# Patient Record
Sex: FEMALE | Race: WHITE | NOT HISPANIC OR LATINO | Employment: OTHER | URBAN - NONMETROPOLITAN AREA
[De-identification: names, ages, dates, MRNs, and addresses within clinical notes are randomized per-mention and may not be internally consistent; named-entity substitution may affect disease eponyms.]

---

## 2017-01-13 RX ORDER — BUDESONIDE AND FORMOTEROL FUMARATE DIHYDRATE 160; 4.5 UG/1; UG/1
1 AEROSOL RESPIRATORY (INHALATION) 2 TIMES DAILY
Qty: 1 INHALER | Refills: 2 | Status: SHIPPED | OUTPATIENT
Start: 2017-01-13 | End: 2017-01-25 | Stop reason: SDUPTHER

## 2017-01-25 ENCOUNTER — TELEPHONE (OUTPATIENT)
Dept: PULMONOLOGY | Facility: CLINIC | Age: 62
End: 2017-01-25

## 2017-01-25 RX ORDER — ALBUTEROL SULFATE 2.5 MG/3ML
0.83 SOLUTION RESPIRATORY (INHALATION) EVERY 4 HOURS PRN
Qty: 120 VIAL | Refills: 2 | Status: SHIPPED | OUTPATIENT
Start: 2017-01-25 | End: 2017-12-09 | Stop reason: SDUPTHER

## 2017-01-25 RX ORDER — BUDESONIDE AND FORMOTEROL FUMARATE DIHYDRATE 160; 4.5 UG/1; UG/1
1 AEROSOL RESPIRATORY (INHALATION) 2 TIMES DAILY
Qty: 1 INHALER | Refills: 2 | Status: SHIPPED | OUTPATIENT
Start: 2017-01-25

## 2017-01-25 RX ORDER — PREDNISONE 10 MG/1
TABLET ORAL
Qty: 31 TABLET | Refills: 0 | Status: SHIPPED | OUTPATIENT
Start: 2017-01-25

## 2017-12-11 RX ORDER — ALBUTEROL SULFATE 2.5 MG/3ML
SOLUTION RESPIRATORY (INHALATION)
Qty: 30 VIAL | Refills: 2 | Status: SHIPPED | OUTPATIENT
Start: 2017-12-11

## 2022-09-01 ENCOUNTER — TRANSCRIBE ORDERS (OUTPATIENT)
Dept: ADMINISTRATIVE | Facility: HOSPITAL | Age: 67
End: 2022-09-01

## 2022-09-01 DIAGNOSIS — N95.9 MENOPAUSAL PROBLEM: Primary | ICD-10-CM

## 2022-09-12 ENCOUNTER — APPOINTMENT (OUTPATIENT)
Dept: BONE DENSITY | Facility: HOSPITAL | Age: 67
End: 2022-09-12

## 2022-09-13 ENCOUNTER — HOSPITAL ENCOUNTER (OUTPATIENT)
Dept: BONE DENSITY | Facility: HOSPITAL | Age: 67
Discharge: HOME | End: 2022-09-13
Admitting: NURSE PRACTITIONER

## 2022-09-13 DIAGNOSIS — N95.9 MENOPAUSAL PROBLEM: ICD-10-CM

## 2022-09-13 PROCEDURE — 77080 DXA BONE DENSITY AXIAL: CPT | Performed by: RADIOLOGY

## 2022-09-13 PROCEDURE — 77080 DXA BONE DENSITY AXIAL: CPT

## 2022-11-30 ENCOUNTER — TRANSCRIBE ORDERS (OUTPATIENT)
Dept: ONCOLOGY | Facility: HOSPITAL | Age: 67
End: 2022-11-30

## 2022-12-06 PROBLEM — M81.0 OSTEOPOROSIS: Status: ACTIVE | Noted: 2022-12-06

## 2022-12-08 ENCOUNTER — INFUSION (OUTPATIENT)
Dept: ONCOLOGY | Facility: HOSPITAL | Age: 67
End: 2022-12-08

## 2022-12-08 VITALS
SYSTOLIC BLOOD PRESSURE: 102 MMHG | TEMPERATURE: 97.3 F | OXYGEN SATURATION: 93 % | RESPIRATION RATE: 18 BRPM | DIASTOLIC BLOOD PRESSURE: 58 MMHG | HEART RATE: 86 BPM

## 2022-12-08 DIAGNOSIS — M81.0 OSTEOPOROSIS, UNSPECIFIED OSTEOPOROSIS TYPE, UNSPECIFIED PATHOLOGICAL FRACTURE PRESENCE: Primary | ICD-10-CM

## 2022-12-08 PROCEDURE — 96372 THER/PROPH/DIAG INJ SC/IM: CPT

## 2022-12-08 PROCEDURE — 25010000002 DENOSUMAB 60 MG/ML SOLUTION PREFILLED SYRINGE: Performed by: NURSE PRACTITIONER

## 2022-12-08 RX ADMIN — DENOSUMAB 60 MG: 60 INJECTION SUBCUTANEOUS at 11:31

## 2023-04-04 ENCOUNTER — OFFICE VISIT (OUTPATIENT)
Dept: CARDIOLOGY | Facility: CLINIC | Age: 68
End: 2023-04-04
Payer: MEDICARE

## 2023-04-04 VITALS
HEIGHT: 66 IN | DIASTOLIC BLOOD PRESSURE: 63 MMHG | SYSTOLIC BLOOD PRESSURE: 100 MMHG | OXYGEN SATURATION: 90 % | WEIGHT: 119.8 LBS | HEART RATE: 78 BPM | BODY MASS INDEX: 19.25 KG/M2 | RESPIRATION RATE: 18 BRPM

## 2023-04-04 DIAGNOSIS — Z87.891 FORMER SMOKER: ICD-10-CM

## 2023-04-04 DIAGNOSIS — R06.02 SHORTNESS OF BREATH: Primary | ICD-10-CM

## 2023-04-04 DIAGNOSIS — I48.0 PAROXYSMAL ATRIAL FIBRILLATION: ICD-10-CM

## 2023-04-04 DIAGNOSIS — J44.9 CHRONIC OBSTRUCTIVE PULMONARY DISEASE, UNSPECIFIED COPD TYPE: ICD-10-CM

## 2023-04-04 PROBLEM — R07.89 CHEST PAIN, ATYPICAL: Status: ACTIVE | Noted: 2023-04-04

## 2023-04-04 PROBLEM — I10 ESSENTIAL HYPERTENSION: Status: ACTIVE | Noted: 2023-04-04

## 2023-04-04 PROBLEM — I30.0 IDIOPATHIC PERICARDITIS: Status: ACTIVE | Noted: 2023-04-04

## 2023-04-04 PROCEDURE — 93000 ELECTROCARDIOGRAM COMPLETE: CPT | Performed by: INTERNAL MEDICINE

## 2023-04-04 PROCEDURE — 3078F DIAST BP <80 MM HG: CPT | Performed by: INTERNAL MEDICINE

## 2023-04-04 PROCEDURE — 99204 OFFICE O/P NEW MOD 45 MIN: CPT | Performed by: INTERNAL MEDICINE

## 2023-04-04 PROCEDURE — 3074F SYST BP LT 130 MM HG: CPT | Performed by: INTERNAL MEDICINE

## 2023-04-04 RX ORDER — NITROGLYCERIN 80 MG/1
1 PATCH TRANSDERMAL DAILY
COMMUNITY

## 2023-04-04 RX ORDER — AZITHROMYCIN 250 MG/1
250 TABLET, FILM COATED ORAL DAILY
COMMUNITY

## 2023-04-04 RX ORDER — ESCITALOPRAM OXALATE 10 MG/1
10 TABLET ORAL DAILY
COMMUNITY

## 2023-04-04 RX ORDER — BENZONATATE 100 MG/1
100 CAPSULE ORAL 3 TIMES DAILY PRN
COMMUNITY

## 2023-04-04 RX ORDER — AMLODIPINE BESYLATE 5 MG/1
5 TABLET ORAL DAILY
COMMUNITY

## 2023-04-04 RX ORDER — ERGOCALCIFEROL 1.25 MG/1
50000 CAPSULE ORAL WEEKLY
COMMUNITY

## 2023-04-04 RX ORDER — VARENICLINE TARTRATE 1 MG/1
1 TABLET, FILM COATED ORAL 2 TIMES DAILY
COMMUNITY

## 2023-04-04 NOTE — LETTER
April 4, 2023     ANGELITO Combs  [address]  [place] [address]    Patient: Federica Rahman   YOB: 1955   Date of Visit: 4/4/2023       Dear ANGELITO Combs,    Federica Rahman was in my office today. Below are the relevant portions of my assessment and plan of care.           If you have questions, please do not hesitate to call me. I look forward to following Federica along with you.         Sincerely,        Janel Aldana MD        CC: No Recipients

## 2023-04-04 NOTE — PROGRESS NOTES
Subjective   Chief Complaint   Patient presents with   • Hypertension     New pt here to Establish new Cardiologist.    • Atrial Fibrillation     New pt here to Establish new Cardiologist.        History of Present Illness  89 years old white female who is recently moved back to [place] from [facility].  She wants to establish herself for cardiac care.    At this time she denies any chest pain palpitations or shortness of breath.    She has history of hypertension, paroxysmal atrial fibrillation, pericarditis COPD, GERD and tobacco use.    Patient states that she developed acute pericarditis which resulted in atrial fibrillation.  She converted to sinus rhythm spontaneously and was never anticoagulated.  She was evaluated by cardiovascular [facility].  PET scan was negative for ischemia.  He is also not taking any antiarrhythmic medications she is just taking metoprolol tartrate 25 twice daily , Norvasc 5 mg daily and aspirin 81 mg daily.  She does carry nitroglycerin but has not had occasion to use it.    Other medications include albuterol, Breo Ellipta, Lexapro and vitamin D    Past Surgical History:   Procedure Laterality Date   • APPENDECTOMY     •  SECTION     • SHOULDER SURGERY       Family History   Problem Relation Age of Onset   • Heart disease Father    • COPD Father    • Diabetes Father      Past Medical History:   Diagnosis Date   • COPD (chronic obstructive pulmonary disease)    • Osteoporosis        Patient Active Problem List   Diagnosis   • Chronic obstructive pulmonary disease   • Hypoxia   • Sleep apnea   • Shortness of breath   • Pneumonia due to Mycoplasma pneumoniae   • Allergic rhinitis   • Hypersomnia   • Former smoker   • Cough   • Osteoporosis   • Essential hypertension   • Paroxysmal atrial fibrillation   • Idiopathic pericarditis, resolved   • Chest pain, atypical, resolved         Social History     Tobacco Use   • Smoking status: Former     Years: 40.00      Types: Cigarettes     Quit date: 2015     Years since quittin.3   • Smokeless tobacco: Never   Substance Use Topics   • Alcohol use: No   • Drug use: No         The following portions of the patient's history were reviewed and updated as appropriate: allergies, current medications, past family history, past medical history, past social history, past surgical history and problem list.    Allergies   Allergen Reactions   • Other          Current Outpatient Medications:   •  albuterol (PROVENTIL) (2.5 MG/3ML) 0.083% nebulizer solution, USE ONE VIAL IN NEBULIZER EVERY 4 HOURS AS NEEDED FOR  SHORTNESS OF AIR, Disp: 30 vial, Rfl: 2  •  amLODIPine (NORVASC) 5 MG tablet, Take 1 tablet by mouth Daily., Disp: , Rfl:   •  azithromycin (ZITHROMAX) 250 MG tablet, Take 1 tablet by mouth Daily. For 4 days, Disp: , Rfl:   •  benzonatate (TESSALON) 100 MG capsule, Take 1 capsule by mouth 3 (Three) Times a Day As Needed for Cough., Disp: , Rfl:   •  escitalopram (LEXAPRO) 10 MG tablet, Take 1 tablet by mouth Daily., Disp: , Rfl:   •  METOPROLOL TARTRATE PO, Take 25 mg by mouth Daily., Disp: , Rfl:   •  Multiple Vitamins-Iron (QC DAILY MULTIVITAMINS/IRON) tablet, Take 1 tablet by mouth daily., Disp: , Rfl:   •  nitroglycerin (NITRODUR) 0.4 MG/HR patch, Place 1 patch on the skin as directed by provider Daily., Disp: , Rfl:   •  predniSONE (DELTASONE) 10 MG tablet, Take 4 tabs daily x 3 days, then take 3 tabs daily x 3 days, then take 2 tabs daily x 3 days, then take 1 tab daily x 3 days, Disp: 31 tablet, Rfl: 0  •  varenicline (CHANTIX) 1 MG tablet, Take 1 tablet by mouth 2 (Two) Times a Day., Disp: , Rfl:   •  VENTOLIN  (90 BASE) MCG/ACT inhaler, Inhale 2 puffs Every 4 (Four) Hours As Needed for shortness of air., Disp: 1 inhaler, Rfl: 2  •  vitamin D (ERGOCALCIFEROL) 1.25 MG (21558 UT) capsule capsule, Take 1 capsule by mouth 1 (One) Time Per Week., Disp: , Rfl:     Review of Systems   Constitutional: Negative.  "  HENT: Negative.  Negative for congestion.    Eyes: Negative.    Cardiovascular: Negative.  Negative for chest pain, cyanosis, dyspnea on exertion, irregular heartbeat, leg swelling, near-syncope, orthopnea, palpitations, paroxysmal nocturnal dyspnea and syncope.   Respiratory: Negative.  Negative for shortness of breath.    Hematologic/Lymphatic: Negative.    Musculoskeletal: Negative.    Gastrointestinal: Negative.    Neurological: Negative.  Negative for headaches.        Objective      /63 (BP Location: Left arm, Patient Position: Sitting, Cuff Size: Adult)   Pulse 78   Resp 18   Ht 167.6 cm (66\")   Wt 54.3 kg (119 lb 12.8 oz)   SpO2 90%   BMI 19.34 kg/m²     Pulmonary:      Effort: Pulmonary effort is normal.      Breath sounds: Normal breath sounds. No stridor. No wheezing. No rhonchi. No rales.   Cardiovascular:      PMI at left midclavicular line. Normal rate. Regular rhythm. Normal S1. Normal S2.      Murmurs: There is no murmur.      No gallop. No click. No rub.   Pulses:     Intact distal pulses.   Edema:     Peripheral edema absent.         Lab Review:    Lab Results   Component Value Date     04/29/2016    K 4.0 04/29/2016     04/29/2016    BUN 18 04/29/2016    CREATININE 0.62 04/29/2016    GLUCOSE 98 04/29/2016    CALCIUM 8.7 04/29/2016    ALT 33 04/29/2016    ALKPHOS 52 04/29/2016    LABIL2 1.7 04/29/2016     Lab Results   Component Value Date    CKTOTAL 92 04/24/2016     Lab Results   Component Value Date    WBC 10.8 04/29/2016    HGB 13.2 04/29/2016    HCT 40.3 04/29/2016     04/29/2016     No results found for: INR  Lab Results   Component Value Date    MG 2.2 04/29/2016     No results found for: PSA, CHOL, CHLPL, TRIG, HDL, VLDL, LDLHDL  Lab Results   Component Value Date    BNP 19 04/21/2016         ECG 12 Lead    Date/Time: 4/4/2023 6:52 PM  Performed by: Janel Aldana MD  Authorized by: Janel Aldana MD   Comparison: compared with previous ECG " from 1/31/2022  Similar to previous ECG  Rhythm: sinus rhythm  Rate: normal  BPM: 74  Conduction: conduction normal  ST Segments: ST segments normal  T Waves: T waves normal  QRS axis: normal  Other: no other findings  Other findings: right atrial abnormality    Clinical impression: non-specific ECG            I reviewed the patient's new clinical results.  I personally viewed and interpreted the patient's EKG/lab data        Assessment:   Diagnosis Plan   1. Shortness of breath  ECG 12 Lead    ECG 12 Lead      2. Former smoker        3. Chronic obstructive pulmonary disease, unspecified COPD type        4. Paroxysmal atrial fibrillation               Plan:  Patient is 67 years old white female who is here to establish for cardiac care however she is asymptomatic at this time.    Old records were reviewed.  Ischemic work-up was negative for exercise-induced myocardial ischemia with normal LV ejection fraction.    Blood pressure is controlled with Norvasc which she will continue.  She was advised to check her blood pressure closely because it might get too low.    Palpitations are controlled with metoprolol 25 twice daily.    She has 1 episode of transient atrial fibrillation, details are not known why she was not anticoagulated.  [id number] score is 3.  Gender  Age  Hypertension  However she is not on any anticoagulation at this time except for aspirin  We will check event monitor next office visit.    She also has pericarditis which apparently has been resolved.  Happened while she was in [place] few years back.    Follow-up scheduled`  Thank you for giving me the oppertunity to participate in your patient's cardiac care.    Sincerely,    FAITH Aldana M.D. [facility]     No follow-ups on file.

## 2023-04-04 NOTE — LETTER
2023     ANGELITO Combs  [address]  [place] [address]    Patient: Federica Rahman   YOB: 1955   Date of Visit: 2023       Dear ANGELITO Combs    Federica Rahman was in my office today. Below is a copy of my note.    If you have questions, please do not hesitate to call me. I look forward to following Federica along with you.         Sincerely,        Janel Aldana MD        CC: No Recipients    Subjective   Chief Complaint   Patient presents with   • Hypertension     New pt here to Establish new Cardiologist.    • Atrial Fibrillation     New pt here to Establish new Cardiologist.        History of Present Illness      Past Surgical History:   Procedure Laterality Date   • APPENDECTOMY     •  SECTION     • SHOULDER SURGERY       Family History   Problem Relation Age of Onset   • Heart disease Father    • COPD Father    • Diabetes Father      Past Medical History:   Diagnosis Date   • COPD (chronic obstructive pulmonary disease)    • Osteoporosis        Patient Active Problem List   Diagnosis   • Chronic obstructive pulmonary disease   • Hypoxia   • Sleep apnea   • Shortness of breath   • Pneumonia due to Mycoplasma pneumoniae   • Allergic rhinitis   • Hypersomnia   • Former smoker   • Cough   • Osteoporosis         Social History     Tobacco Use   • Smoking status: Former     Years: 40.00     Types: Cigarettes     Quit date: 2015     Years since quittin.3   • Smokeless tobacco: Never   Substance Use Topics   • Alcohol use: No   • Drug use: No         The following portions of the patient's history were reviewed and updated as appropriate: allergies, current medications, past family history, past medical history, past social history, past surgical history and problem list.    Allergies   Allergen Reactions   • Other          Current Outpatient Medications:   •  albuterol (PROVENTIL) (2.5 MG/3ML) 0.083% nebulizer solution, USE ONE VIAL  "IN NEBULIZER EVERY 4 HOURS AS NEEDED FOR  SHORTNESS OF AIR, Disp: 30 vial, Rfl: 2  •  amLODIPine (NORVASC) 5 MG tablet, Take 1 tablet by mouth Daily., Disp: , Rfl:   •  azithromycin (ZITHROMAX) 250 MG tablet, Take 1 tablet by mouth Daily. For 4 days, Disp: , Rfl:   •  benzonatate (TESSALON) 100 MG capsule, Take 1 capsule by mouth 3 (Three) Times a Day As Needed for Cough., Disp: , Rfl:   •  escitalopram (LEXAPRO) 10 MG tablet, Take 1 tablet by mouth Daily., Disp: , Rfl:   •  METOPROLOL TARTRATE PO, Take 25 mg by mouth Daily., Disp: , Rfl:   •  Multiple Vitamins-Iron (QC DAILY MULTIVITAMINS/IRON) tablet, Take 1 tablet by mouth daily., Disp: , Rfl:   •  nitroglycerin (NITRODUR) 0.4 MG/HR patch, Place 1 patch on the skin as directed by provider Daily., Disp: , Rfl:   •  predniSONE (DELTASONE) 10 MG tablet, Take 4 tabs daily x 3 days, then take 3 tabs daily x 3 days, then take 2 tabs daily x 3 days, then take 1 tab daily x 3 days, Disp: 31 tablet, Rfl: 0  •  varenicline (CHANTIX) 1 MG tablet, Take 1 tablet by mouth 2 (Two) Times a Day., Disp: , Rfl:   •  VENTOLIN  (90 BASE) MCG/ACT inhaler, Inhale 2 puffs Every 4 (Four) Hours As Needed for shortness of air., Disp: 1 inhaler, Rfl: 2  •  vitamin D (ERGOCALCIFEROL) 1.25 MG (58057 UT) capsule capsule, Take 1 capsule by mouth 1 (One) Time Per Week., Disp: , Rfl:   •  budesonide-formoterol (SYMBICORT) 160-4.5 MCG/ACT inhaler, Inhale 1 puff 2 (Two) Times a Day. (Patient not taking: Reported on 4/4/2023), Disp: 1 inhaler, Rfl: 2    ROS    Objective      /63 (BP Location: Left arm, Patient Position: Sitting, Cuff Size: Adult)   Pulse 78   Resp 18   Ht 167.6 cm (66\")   Wt 54.3 kg (119 lb 12.8 oz)   SpO2 90%   BMI 19.34 kg/m²     Physical Exam    Lab Review:    Lab Results   Component Value Date     04/29/2016    K 4.0 04/29/2016     04/29/2016    BUN 18 04/29/2016    CREATININE 0.62 04/29/2016    GLUCOSE 98 04/29/2016    CALCIUM 8.7 04/29/2016    " ALT 33 04/29/2016    ALKPHOS 52 04/29/2016    LABIL2 1.7 04/29/2016     Lab Results   Component Value Date    CKTOTAL 92 04/24/2016     Lab Results   Component Value Date    WBC 10.8 04/29/2016    HGB 13.2 04/29/2016    HCT 40.3 04/29/2016     04/29/2016     No results found for: INR  Lab Results   Component Value Date    MG 2.2 04/29/2016     No results found for: PSA, CHOL, CHLPL, TRIG, HDL, VLDL, LDLHDL  Lab Results   Component Value Date    BNP 19 04/21/2016       Procedures    I reviewed the patient's new clinical results.  I personally viewed and interpreted the patient's EKG/lab data       Assessment:  No diagnosis found.       Plan:  No diagnosis found.      Thank you for giving me the oppertunity to participate in your patient's cardiac care.    Sincerely,    FAITH Aldana M.D. FACP [facility]     No follow-ups on file.

## 2023-06-01 ENCOUNTER — OFFICE VISIT (OUTPATIENT)
Dept: CARDIOLOGY | Facility: CLINIC | Age: 68
End: 2023-06-01

## 2023-06-01 VITALS
OXYGEN SATURATION: 97 % | HEIGHT: 66 IN | BODY MASS INDEX: 18.64 KG/M2 | HEART RATE: 66 BPM | WEIGHT: 116 LBS | DIASTOLIC BLOOD PRESSURE: 66 MMHG | SYSTOLIC BLOOD PRESSURE: 116 MMHG

## 2023-06-01 DIAGNOSIS — I10 ESSENTIAL HYPERTENSION: ICD-10-CM

## 2023-06-01 DIAGNOSIS — I48.0 PAROXYSMAL ATRIAL FIBRILLATION: Primary | ICD-10-CM

## 2023-06-01 DIAGNOSIS — J44.9 CHRONIC OBSTRUCTIVE PULMONARY DISEASE, UNSPECIFIED COPD TYPE: ICD-10-CM

## 2023-06-01 NOTE — PROGRESS NOTES
subjective     Chief Complaint   Patient presents with   • Atrial Fibrillation     Follow up   • Shortness of Breath     Follow up   no change     History of Present Illness    Federica is 67 years old white female who is here for cardiology follow-up  She is asymptomatic  Blood pressure is controlled with Norvasc 5 mg daily and metoprolol .  She denies any chest pain palpitations.  She has history of COPD and has been taking albuterol nebulizer treatments and Ventolin HFA.    She has history of transient atrial fibrillation after acute pericarditis while she was in [place]  She converted to sinus rhythm spontaneously and was never anticoagulated.  She was evaluated by cardiovascular [place] [facility].  PET scan was negative for ischemia.  He is also not taking any antiarrhythmic medications she is just taking metoprolol tartrate 25 twice daily , Norvasc 5 mg daily and aspirin 81 mg daily.    Past Surgical History:   Procedure Laterality Date   • APPENDECTOMY     •  SECTION     • SHOULDER SURGERY       Family History   Problem Relation Age of Onset   • Heart disease Father    • COPD Father    • Diabetes Father      Past Medical History:   Diagnosis Date   • COPD (chronic obstructive pulmonary disease)    • Osteoporosis      Patient Active Problem List   Diagnosis   • Chronic obstructive pulmonary disease   • Hypoxia   • Sleep apnea   • Shortness of breath   • Pneumonia due to Mycoplasma pneumoniae   • Allergic rhinitis   • Hypersomnia   • Former smoker   • Cough   • Osteoporosis   • Essential hypertension   • Paroxysmal atrial fibrillation   • Idiopathic pericarditis, resolved   • Chest pain, atypical, resolved       Social History     Tobacco Use   • Smoking status: Former     Years: 40.00     Types: Cigarettes     Quit date: 2015     Years since quittin.5   • Smokeless tobacco: Never   Substance Use Topics   • Alcohol use: No   • Drug use: No       Allergies   Allergen Reactions   • Other         Current Outpatient Medications on File Prior to Visit   Medication Sig   • albuterol (PROVENTIL) (2.5 MG/3ML) 0.083% nebulizer solution USE ONE VIAL IN NEBULIZER EVERY 4 HOURS AS NEEDED FOR  SHORTNESS OF AIR   • amLODIPine (NORVASC) 5 MG tablet Take 1 tablet by mouth Daily.   • escitalopram (LEXAPRO) 10 MG tablet Take 1 tablet by mouth Daily.   • METOPROLOL TARTRATE PO Take 25 mg by mouth Daily.   • VENTOLIN  (90 BASE) MCG/ACT inhaler Inhale 2 puffs Every 4 (Four) Hours As Needed for shortness of air.   • vitamin D (ERGOCALCIFEROL) 1.25 MG (14298 UT) capsule capsule Take 1 capsule by mouth 1 (One) Time Per Week.   • Multiple Vitamins-Iron (QC DAILY MULTIVITAMINS/IRON) tablet Take 1 tablet by mouth daily.   • [DISCONTINUED] azithromycin (ZITHROMAX) 250 MG tablet Take 1 tablet by mouth Daily. For 4 days   • [DISCONTINUED] benzonatate (TESSALON) 100 MG capsule Take 1 capsule by mouth 3 (Three) Times a Day As Needed for Cough.   • [DISCONTINUED] nitroglycerin (NITRODUR) 0.4 MG/HR patch Place 1 patch on the skin as directed by provider Daily. (Patient not taking: Reported on 6/1/2023)   • [DISCONTINUED] predniSONE (DELTASONE) 10 MG tablet Take 4 tabs daily x 3 days, then take 3 tabs daily x 3 days, then take 2 tabs daily x 3 days, then take 1 tab daily x 3 days (Patient not taking: Reported on 6/1/2023)   • [DISCONTINUED] varenicline (CHANTIX) 1 MG tablet Take 1 tablet by mouth 2 (Two) Times a Day. (Patient not taking: Reported on 6/1/2023)     No current facility-administered medications on file prior to visit.         The following portions of the patient's history were reviewed and updated as appropriate: allergies, current medications, past family history, past medical history, past social history, past surgical history and problem list.    Review of Systems   Constitutional: Negative.   HENT: Negative.  Negative for congestion.    Eyes: Negative.    Cardiovascular: Negative.  Negative for chest pain,  "cyanosis, dyspnea on exertion, irregular heartbeat, leg swelling, near-syncope, orthopnea, palpitations, paroxysmal nocturnal dyspnea and syncope.   Respiratory: Negative.  Negative for shortness of breath.    Hematologic/Lymphatic: Negative.    Musculoskeletal: Negative.    Gastrointestinal: Negative.    Neurological: Negative.  Negative for headaches.          Objective:     /66 (BP Location: Left arm, Patient Position: Sitting, Cuff Size: Adult)   Pulse 66   Ht 167.6 cm (66\")   Wt 52.6 kg (116 lb)   SpO2 97%   BMI 18.72 kg/m²   Pulmonary:      Breath sounds: No wheezing. Rhonchi present. No rales.   Cardiovascular:      PMI at left midclavicular line. Normal rate. Regular rhythm. Normal S1. Normal S2.      Murmurs: There is no murmur.      No gallop. No click. No rub.   Pulses:     Intact distal pulses.   Edema:     Peripheral edema absent.           Lab Review  Lab Results   Component Value Date     04/29/2016    K 4.0 04/29/2016     04/29/2016    BUN 18 04/29/2016    CREATININE 0.62 04/29/2016    GLUCOSE 98 04/29/2016    CALCIUM 8.7 04/29/2016    ALT 33 04/29/2016    ALKPHOS 52 04/29/2016    LABIL2 1.7 04/29/2016     Lab Results   Component Value Date    CKTOTAL 92 04/24/2016     Lab Results   Component Value Date    WBC 10.8 04/29/2016    HGB 13.2 04/29/2016    HCT 40.3 04/29/2016     04/29/2016     No results found for: INR  Lab Results   Component Value Date    MG 2.2 04/29/2016     Lab Results   Component Value Date    TSH 0.335 (L) 12/07/2015     Lab Results   Component Value Date    BNP 19 04/21/2016     No results found for: CHLPL, CHOL, TRIG, HDL, VLDL, LDLHDL  No results found for: LDL  No results found for: PROBNP            Procedures       I personally viewed and interpreted the patient's LAB data         Assessment:     1. Paroxysmal atrial fibrillation    2. Essential hypertension    3. Chronic obstructive pulmonary disease, unspecified COPD type          Plan: "     Patient has history of transient atrial fibrillation after acute pericarditis few years ago.  Since then she has not had any palpitations and is not on anticoagulation.  We will arrange 2 weeks Holter monitor to assess arrhythmia status.  Meanwhile she will continue aspirin.  Blood pressure is controlled with lisinopril and Toprol.    She also has COPD and has mild wheezing today she is on Ventolin HFA and Proventil nebulizer treatment.  She will be reevaluated in 6 months.      No follow-ups on file.

## 2023-06-01 NOTE — LETTER
2023     ANGELITO Combs  [address]  [place] [address]    Patient: Federica Rahman   YOB: 1955   Date of Visit: 2023       Dear ANGELITO Combs    Federica Rahman was in my office today. Below is a copy of my note.    If you have questions, please do not hesitate to call me. I look forward to following Federica along with you.         Sincerely,        Janel Aldana MD        CC: No Recipients    subjective     Chief Complaint   Patient presents with   • Atrial Fibrillation     Follow up   • Shortness of Breath     Follow up   no change     History of Present Illness          Past Surgical History:   Procedure Laterality Date   • APPENDECTOMY     •  SECTION     • SHOULDER SURGERY       Family History   Problem Relation Age of Onset   • Heart disease Father    • COPD Father    • Diabetes Father      Past Medical History:   Diagnosis Date   • COPD (chronic obstructive pulmonary disease)    • Osteoporosis      Patient Active Problem List   Diagnosis   • Chronic obstructive pulmonary disease   • Hypoxia   • Sleep apnea   • Shortness of breath   • Pneumonia due to Mycoplasma pneumoniae   • Allergic rhinitis   • Hypersomnia   • Former smoker   • Cough   • Osteoporosis   • Essential hypertension   • Paroxysmal atrial fibrillation   • Idiopathic pericarditis, resolved   • Chest pain, atypical, resolved       Social History     Tobacco Use   • Smoking status: Former     Years: 40.00     Types: Cigarettes     Quit date: 2015     Years since quittin.5   • Smokeless tobacco: Never   Substance Use Topics   • Alcohol use: No   • Drug use: No       Allergies   Allergen Reactions   • Other        Current Outpatient Medications on File Prior to Visit   Medication Sig   • albuterol (PROVENTIL) (2.5 MG/3ML) 0.083% nebulizer solution USE ONE VIAL IN NEBULIZER EVERY 4 HOURS AS NEEDED FOR  SHORTNESS OF AIR   • amLODIPine (NORVASC) 5 MG tablet Take 1 tablet by  "mouth Daily.   • escitalopram (LEXAPRO) 10 MG tablet Take 1 tablet by mouth Daily.   • METOPROLOL TARTRATE PO Take 25 mg by mouth Daily.   • VENTOLIN  (90 BASE) MCG/ACT inhaler Inhale 2 puffs Every 4 (Four) Hours As Needed for shortness of air.   • vitamin D (ERGOCALCIFEROL) 1.25 MG (93756 UT) capsule capsule Take 1 capsule by mouth 1 (One) Time Per Week.   • Multiple Vitamins-Iron (QC DAILY MULTIVITAMINS/IRON) tablet Take 1 tablet by mouth daily.   • nitroglycerin (NITRODUR) 0.4 MG/HR patch Place 1 patch on the skin as directed by provider Daily. (Patient not taking: Reported on 6/1/2023)   • varenicline (CHANTIX) 1 MG tablet Take 1 tablet by mouth 2 (Two) Times a Day. (Patient not taking: Reported on 6/1/2023)   • [DISCONTINUED] azithromycin (ZITHROMAX) 250 MG tablet Take 1 tablet by mouth Daily. For 4 days   • [DISCONTINUED] benzonatate (TESSALON) 100 MG capsule Take 1 capsule by mouth 3 (Three) Times a Day As Needed for Cough.   • [DISCONTINUED] predniSONE (DELTASONE) 10 MG tablet Take 4 tabs daily x 3 days, then take 3 tabs daily x 3 days, then take 2 tabs daily x 3 days, then take 1 tab daily x 3 days (Patient not taking: Reported on 6/1/2023)     No current facility-administered medications on file prior to visit.         The following portions of the patient's history were reviewed and updated as appropriate: allergies, current medications, past family history, past medical history, past social history, past surgical history and problem list.    ROS      Objective:     /66 (BP Location: Left arm, Patient Position: Sitting, Cuff Size: Adult)   Pulse 66   Ht 167.6 cm (66\")   Wt 52.6 kg (116 lb)   SpO2 97%   BMI 18.72 kg/m²   Physical Exam      Lab Review  Lab Results   Component Value Date     04/29/2016    K 4.0 04/29/2016     04/29/2016    BUN 18 04/29/2016    CREATININE 0.62 04/29/2016    GLUCOSE 98 04/29/2016    CALCIUM 8.7 04/29/2016    ALT 33 04/29/2016    ALKPHOS 52 " 04/29/2016    LABIL2 1.7 04/29/2016     Lab Results   Component Value Date    CKTOTAL 92 04/24/2016     Lab Results   Component Value Date    WBC 10.8 04/29/2016    HGB 13.2 04/29/2016    HCT 40.3 04/29/2016     04/29/2016     No results found for: INR  Lab Results   Component Value Date    MG 2.2 04/29/2016     Lab Results   Component Value Date    TSH 0.335 (L) 12/07/2015     Lab Results   Component Value Date    BNP 19 04/21/2016     No results found for: CHLPL, CHOL, TRIG, HDL, VLDL, LDLHDL  No results found for: LDL  No results found for: PROBNP            Procedures       I personally viewed and interpreted the patient's LAB data        Assessment:   No diagnosis found.      Plan:              No follow-ups on file.

## 2023-06-05 ENCOUNTER — INFUSION (OUTPATIENT)
Dept: ONCOLOGY | Facility: HOSPITAL | Age: 68
End: 2023-06-05
Payer: MEDICARE

## 2023-06-05 VITALS
SYSTOLIC BLOOD PRESSURE: 129 MMHG | OXYGEN SATURATION: 95 % | TEMPERATURE: 96.9 F | HEART RATE: 68 BPM | DIASTOLIC BLOOD PRESSURE: 77 MMHG | RESPIRATION RATE: 18 BRPM

## 2023-06-05 DIAGNOSIS — M81.0 OSTEOPOROSIS, UNSPECIFIED OSTEOPOROSIS TYPE, UNSPECIFIED PATHOLOGICAL FRACTURE PRESENCE: Primary | ICD-10-CM

## 2023-06-05 LAB — CALCIUM SPEC-SCNC: 9.1 MG/DL (ref 8.6–10.5)

## 2023-06-05 PROCEDURE — 82310 ASSAY OF CALCIUM: CPT | Performed by: NURSE PRACTITIONER

## 2023-06-05 PROCEDURE — 96372 THER/PROPH/DIAG INJ SC/IM: CPT

## 2023-06-05 PROCEDURE — 25010000002 DENOSUMAB 60 MG/ML SOLUTION PREFILLED SYRINGE: Performed by: NURSE PRACTITIONER

## 2023-06-05 RX ADMIN — DENOSUMAB 60 MG: 60 INJECTION SUBCUTANEOUS at 12:20

## 2023-08-17 ENCOUNTER — LAB (OUTPATIENT)
Dept: LAB | Facility: HOSPITAL | Age: 68
End: 2023-08-17
Payer: MEDICARE

## 2023-08-17 ENCOUNTER — TRANSCRIBE ORDERS (OUTPATIENT)
Dept: ADMINISTRATIVE | Facility: HOSPITAL | Age: 68
End: 2023-08-17
Payer: MEDICARE

## 2023-08-17 DIAGNOSIS — E53.9 VITAMIN B DEFICIENCY: ICD-10-CM

## 2023-08-17 DIAGNOSIS — I51.9 MYXEDEMA HEART DISEASE: ICD-10-CM

## 2023-08-17 DIAGNOSIS — E78.5 HYPERLIPIDEMIA, UNSPECIFIED HYPERLIPIDEMIA TYPE: ICD-10-CM

## 2023-08-17 DIAGNOSIS — E55.9 VITAMIN D DEFICIENCY: ICD-10-CM

## 2023-08-17 DIAGNOSIS — I10 ESSENTIAL (PRIMARY) HYPERTENSION: ICD-10-CM

## 2023-08-17 DIAGNOSIS — R73.9 BLOOD GLUCOSE ELEVATED: ICD-10-CM

## 2023-08-17 DIAGNOSIS — I10 ESSENTIAL (PRIMARY) HYPERTENSION: Primary | ICD-10-CM

## 2023-08-17 DIAGNOSIS — E03.9 MYXEDEMA HEART DISEASE: ICD-10-CM

## 2023-08-17 LAB
25(OH)D3 SERPL-MCNC: 75.3 NG/ML (ref 30–100)
ALBUMIN SERPL-MCNC: 4.5 G/DL (ref 3.5–5.2)
ALBUMIN/GLOB SERPL: 2.4 G/DL
ALP SERPL-CCNC: 46 U/L (ref 39–117)
ALT SERPL W P-5'-P-CCNC: 14 U/L (ref 1–33)
ANION GAP SERPL CALCULATED.3IONS-SCNC: 9 MMOL/L (ref 5–15)
AST SERPL-CCNC: 19 U/L (ref 1–32)
BASOPHILS # BLD AUTO: 0.06 10*3/MM3 (ref 0–0.2)
BASOPHILS NFR BLD AUTO: 1.1 % (ref 0–1.5)
BILIRUB SERPL-MCNC: 0.3 MG/DL (ref 0–1.2)
BUN SERPL-MCNC: 13 MG/DL (ref 8–23)
BUN/CREAT SERPL: 23.2 (ref 7–25)
CALCIUM SPEC-SCNC: 9.3 MG/DL (ref 8.6–10.5)
CHLORIDE SERPL-SCNC: 100 MMOL/L (ref 98–107)
CHOLEST SERPL-MCNC: 204 MG/DL (ref 0–200)
CO2 SERPL-SCNC: 32 MMOL/L (ref 22–29)
CREAT SERPL-MCNC: 0.56 MG/DL (ref 0.57–1)
DEPRECATED RDW RBC AUTO: 44.3 FL (ref 37–54)
EGFRCR SERPLBLD CKD-EPI 2021: 100.2 ML/MIN/1.73
EOSINOPHIL # BLD AUTO: 0.13 10*3/MM3 (ref 0–0.4)
EOSINOPHIL NFR BLD AUTO: 2.3 % (ref 0.3–6.2)
ERYTHROCYTE [DISTWIDTH] IN BLOOD BY AUTOMATED COUNT: 12.4 % (ref 12.3–15.4)
GLOBULIN UR ELPH-MCNC: 1.9 GM/DL
GLUCOSE SERPL-MCNC: 85 MG/DL (ref 65–99)
HBA1C MFR BLD: 5.4 % (ref 4.8–5.6)
HCT VFR BLD AUTO: 42.5 % (ref 34–46.6)
HDLC SERPL-MCNC: 91 MG/DL (ref 40–60)
HGB BLD-MCNC: 13.1 G/DL (ref 12–15.9)
IMM GRANULOCYTES # BLD AUTO: 0.01 10*3/MM3 (ref 0–0.05)
IMM GRANULOCYTES NFR BLD AUTO: 0.2 % (ref 0–0.5)
LDLC SERPL CALC-MCNC: 99 MG/DL (ref 0–100)
LDLC/HDLC SERPL: 1.06 {RATIO}
LYMPHOCYTES # BLD AUTO: 1.33 10*3/MM3 (ref 0.7–3.1)
LYMPHOCYTES NFR BLD AUTO: 23.4 % (ref 19.6–45.3)
MCH RBC QN AUTO: 30 PG (ref 26.6–33)
MCHC RBC AUTO-ENTMCNC: 30.8 G/DL (ref 31.5–35.7)
MCV RBC AUTO: 97.3 FL (ref 79–97)
MONOCYTES # BLD AUTO: 0.62 10*3/MM3 (ref 0.1–0.9)
MONOCYTES NFR BLD AUTO: 10.9 % (ref 5–12)
NEUTROPHILS NFR BLD AUTO: 3.53 10*3/MM3 (ref 1.7–7)
NEUTROPHILS NFR BLD AUTO: 62.1 % (ref 42.7–76)
NRBC BLD AUTO-RTO: 0 /100 WBC (ref 0–0.2)
PLATELET # BLD AUTO: 243 10*3/MM3 (ref 140–450)
PMV BLD AUTO: 9.2 FL (ref 6–12)
POTASSIUM SERPL-SCNC: 4.4 MMOL/L (ref 3.5–5.2)
PROT SERPL-MCNC: 6.4 G/DL (ref 6–8.5)
RBC # BLD AUTO: 4.37 10*6/MM3 (ref 3.77–5.28)
SODIUM SERPL-SCNC: 141 MMOL/L (ref 136–145)
T3FREE SERPL-MCNC: 3.14 PG/ML (ref 2–4.4)
T4 SERPL-MCNC: 6.46 MCG/DL (ref 4.5–11.7)
TRIGL SERPL-MCNC: 81 MG/DL (ref 0–150)
TSH SERPL DL<=0.05 MIU/L-ACNC: 1.5 UIU/ML (ref 0.27–4.2)
VIT B12 BLD-MCNC: 796 PG/ML (ref 211–946)
VLDLC SERPL-MCNC: 14 MG/DL (ref 5–40)
WBC NRBC COR # BLD: 5.68 10*3/MM3 (ref 3.4–10.8)

## 2023-08-17 PROCEDURE — 84481 FREE ASSAY (FT-3): CPT

## 2023-08-17 PROCEDURE — 85025 COMPLETE CBC W/AUTO DIFF WBC: CPT

## 2023-08-17 PROCEDURE — 82306 VITAMIN D 25 HYDROXY: CPT

## 2023-08-17 PROCEDURE — 80053 COMPREHEN METABOLIC PANEL: CPT

## 2023-08-17 PROCEDURE — 80061 LIPID PANEL: CPT

## 2023-08-17 PROCEDURE — 82607 VITAMIN B-12: CPT

## 2023-08-17 PROCEDURE — 83036 HEMOGLOBIN GLYCOSYLATED A1C: CPT

## 2023-08-17 PROCEDURE — 36415 COLL VENOUS BLD VENIPUNCTURE: CPT

## 2023-08-17 PROCEDURE — 84436 ASSAY OF TOTAL THYROXINE: CPT

## 2023-08-17 PROCEDURE — 84443 ASSAY THYROID STIM HORMONE: CPT

## 2023-10-04 ENCOUNTER — HOSPITAL ENCOUNTER (EMERGENCY)
Facility: HOSPITAL | Age: 68
Discharge: HOME | End: 2023-10-04
Attending: STUDENT IN AN ORGANIZED HEALTH CARE EDUCATION/TRAINING PROGRAM | Admitting: STUDENT IN AN ORGANIZED HEALTH CARE EDUCATION/TRAINING PROGRAM
Payer: MEDICARE

## 2023-10-04 ENCOUNTER — APPOINTMENT (OUTPATIENT)
Dept: CT IMAGING | Facility: HOSPITAL | Age: 68
End: 2023-10-04
Payer: MEDICARE

## 2023-10-04 ENCOUNTER — APPOINTMENT (OUTPATIENT)
Dept: GENERAL RADIOLOGY | Facility: HOSPITAL | Age: 68
End: 2023-10-04
Payer: MEDICARE

## 2023-10-04 VITALS
DIASTOLIC BLOOD PRESSURE: 65 MMHG | OXYGEN SATURATION: 98 % | WEIGHT: 117 LBS | SYSTOLIC BLOOD PRESSURE: 104 MMHG | BODY MASS INDEX: 18.8 KG/M2 | HEIGHT: 66 IN | TEMPERATURE: 98.5 F | RESPIRATION RATE: 20 BRPM | HEART RATE: 79 BPM

## 2023-10-04 DIAGNOSIS — J44.1 COPD EXACERBATION: Primary | ICD-10-CM

## 2023-10-04 LAB
A-A DO2: 46.3 MMHG (ref 0–300)
A-A DO2: 64.3 MMHG (ref 0–300)
ALBUMIN SERPL-MCNC: 4.2 G/DL (ref 3.5–5.2)
ALBUMIN/GLOB SERPL: 1.8 G/DL
ALP SERPL-CCNC: 39 U/L (ref 39–117)
ALT SERPL W P-5'-P-CCNC: 12 U/L (ref 1–33)
ANION GAP SERPL CALCULATED.3IONS-SCNC: 6.5 MMOL/L (ref 5–15)
ARTERIAL PATENCY WRIST A: POSITIVE
ARTERIAL PATENCY WRIST A: POSITIVE
AST SERPL-CCNC: 17 U/L (ref 1–32)
ATMOSPHERIC PRESS: 731 MMHG
ATMOSPHERIC PRESS: 732 MMHG
BASE EXCESS BLDA CALC-SCNC: 11.4 MMOL/L (ref 0–2)
BASE EXCESS BLDA CALC-SCNC: 9.9 MMOL/L (ref 0–2)
BASOPHILS # BLD AUTO: 0.06 10*3/MM3 (ref 0–0.2)
BASOPHILS NFR BLD AUTO: 0.7 % (ref 0–1.5)
BDY SITE: ABNORMAL
BDY SITE: ABNORMAL
BILIRUB SERPL-MCNC: 0.2 MG/DL (ref 0–1.2)
BUN SERPL-MCNC: 14 MG/DL (ref 8–23)
BUN/CREAT SERPL: 32.6 (ref 7–25)
CALCIUM SPEC-SCNC: 9 MG/DL (ref 8.6–10.5)
CHLORIDE SERPL-SCNC: 95 MMOL/L (ref 98–107)
CO2 BLDA-SCNC: 39.8 MMOL/L (ref 22–33)
CO2 BLDA-SCNC: 41.5 MMOL/L (ref 22–33)
CO2 SERPL-SCNC: 34.5 MMOL/L (ref 22–29)
COHGB MFR BLD: 1.3 % (ref 0–5)
COHGB MFR BLD: 1.5 % (ref 0–5)
CREAT SERPL-MCNC: 0.43 MG/DL (ref 0.57–1)
CRP SERPL-MCNC: <0.3 MG/DL (ref 0–0.5)
D-LACTATE SERPL-SCNC: 1.3 MMOL/L (ref 0.5–2)
DEPRECATED RDW RBC AUTO: 45.2 FL (ref 37–54)
EGFRCR SERPLBLD CKD-EPI 2021: 106.1 ML/MIN/1.73
EOSINOPHIL # BLD AUTO: 0.1 10*3/MM3 (ref 0–0.4)
EOSINOPHIL NFR BLD AUTO: 1.2 % (ref 0.3–6.2)
ERYTHROCYTE [DISTWIDTH] IN BLOOD BY AUTOMATED COUNT: 12.4 % (ref 12.3–15.4)
ERYTHROCYTE [SEDIMENTATION RATE] IN BLOOD: 10 MM/HR (ref 0–30)
FLUAV RNA RESP QL NAA+PROBE: NOT DETECTED
FLUBV RNA RESP QL NAA+PROBE: NOT DETECTED
GAS FLOW AIRWAY: 2 LPM
GAS FLOW AIRWAY: 2 LPM
GEN 5 2HR TROPONIN T REFLEX: 9 NG/L
GLOBULIN UR ELPH-MCNC: 2.4 GM/DL
GLUCOSE SERPL-MCNC: 102 MG/DL (ref 65–99)
HCO3 BLDA-SCNC: 37.7 MMOL/L (ref 20–26)
HCO3 BLDA-SCNC: 39.4 MMOL/L (ref 20–26)
HCT VFR BLD AUTO: 42.4 % (ref 34–46.6)
HCT VFR BLD CALC: 38.3 % (ref 38–51)
HCT VFR BLD CALC: 38.8 % (ref 38–51)
HGB BLD-MCNC: 12.8 G/DL (ref 12–15.9)
HGB BLDA-MCNC: 12.5 G/DL (ref 13.5–17.5)
HGB BLDA-MCNC: 12.7 G/DL (ref 13.5–17.5)
HOLD SPECIMEN: NORMAL
IMM GRANULOCYTES # BLD AUTO: 0.02 10*3/MM3 (ref 0–0.05)
IMM GRANULOCYTES NFR BLD AUTO: 0.2 % (ref 0–0.5)
INHALED O2 CONCENTRATION: 28 %
INHALED O2 CONCENTRATION: 28 %
LYMPHOCYTES # BLD AUTO: 1.05 10*3/MM3 (ref 0.7–3.1)
LYMPHOCYTES NFR BLD AUTO: 12.9 % (ref 19.6–45.3)
Lab: ABNORMAL
MCH RBC QN AUTO: 29.9 PG (ref 26.6–33)
MCHC RBC AUTO-ENTMCNC: 30.2 G/DL (ref 31.5–35.7)
MCV RBC AUTO: 99.1 FL (ref 79–97)
METHGB BLD QL: 0.2 % (ref 0–3)
METHGB BLD QL: 0.2 % (ref 0–3)
MODALITY: ABNORMAL
MODALITY: ABNORMAL
MONOCYTES # BLD AUTO: 0.72 10*3/MM3 (ref 0.1–0.9)
MONOCYTES NFR BLD AUTO: 8.8 % (ref 5–12)
NEUTROPHILS NFR BLD AUTO: 6.19 10*3/MM3 (ref 1.7–7)
NEUTROPHILS NFR BLD AUTO: 76.2 % (ref 42.7–76)
NOTIFIED BY: ABNORMAL
NOTIFIED WHO: ABNORMAL
NRBC BLD AUTO-RTO: 0 /100 WBC (ref 0–0.2)
NT-PROBNP SERPL-MCNC: 253.4 PG/ML (ref 0–900)
OXYHGB MFR BLDV: 88.7 % (ref 94–99)
OXYHGB MFR BLDV: 94.7 % (ref 94–99)
PCO2 BLDA: 66.1 MM HG (ref 35–45)
PCO2 BLDA: 68.2 MM HG (ref 35–45)
PCO2 TEMP ADJ BLD: ABNORMAL MM[HG]
PCO2 TEMP ADJ BLD: ABNORMAL MM[HG]
PH BLDA: 7.37 PH UNITS (ref 7.35–7.45)
PH BLDA: 7.37 PH UNITS (ref 7.35–7.45)
PH, TEMP CORRECTED: ABNORMAL
PH, TEMP CORRECTED: ABNORMAL
PLATELET # BLD AUTO: 263 10*3/MM3 (ref 140–450)
PMV BLD AUTO: 9.4 FL (ref 6–12)
PO2 BLDA: 51.2 MM HG (ref 83–108)
PO2 BLDA: 71.4 MM HG (ref 83–108)
PO2 TEMP ADJ BLD: ABNORMAL MM[HG]
PO2 TEMP ADJ BLD: ABNORMAL MM[HG]
POTASSIUM SERPL-SCNC: 4.7 MMOL/L (ref 3.5–5.2)
PROT SERPL-MCNC: 6.6 G/DL (ref 6–8.5)
QT INTERVAL: 394 MS
QTC INTERVAL: 403 MS
RBC # BLD AUTO: 4.28 10*6/MM3 (ref 3.77–5.28)
SAO2 % BLDCOA: 90.2 % (ref 94–99)
SAO2 % BLDCOA: 96.1 % (ref 94–99)
SARS-COV-2 RNA RESP QL NAA+PROBE: NOT DETECTED
SODIUM SERPL-SCNC: 136 MMOL/L (ref 136–145)
TROPONIN T DELTA: -2 NG/L
TROPONIN T SERPL HS-MCNC: 11 NG/L
VENTILATOR MODE: ABNORMAL
VENTILATOR MODE: ABNORMAL
WBC NRBC COR # BLD: 8.14 10*3/MM3 (ref 3.4–10.8)
WHOLE BLOOD HOLD COAG: NORMAL
WHOLE BLOOD HOLD SPECIMEN: NORMAL

## 2023-10-04 PROCEDURE — 85025 COMPLETE CBC W/AUTO DIFF WBC: CPT | Performed by: PHYSICIAN ASSISTANT

## 2023-10-04 PROCEDURE — 82375 ASSAY CARBOXYHB QUANT: CPT

## 2023-10-04 PROCEDURE — 83880 ASSAY OF NATRIURETIC PEPTIDE: CPT | Performed by: PHYSICIAN ASSISTANT

## 2023-10-04 PROCEDURE — 71046 X-RAY EXAM CHEST 2 VIEWS: CPT | Performed by: RADIOLOGY

## 2023-10-04 PROCEDURE — 86140 C-REACTIVE PROTEIN: CPT | Performed by: PHYSICIAN ASSISTANT

## 2023-10-04 PROCEDURE — 93005 ELECTROCARDIOGRAM TRACING: CPT | Performed by: STUDENT IN AN ORGANIZED HEALTH CARE EDUCATION/TRAINING PROGRAM

## 2023-10-04 PROCEDURE — 80053 COMPREHEN METABOLIC PANEL: CPT | Performed by: PHYSICIAN ASSISTANT

## 2023-10-04 PROCEDURE — 96374 THER/PROPH/DIAG INJ IV PUSH: CPT

## 2023-10-04 PROCEDURE — 36600 WITHDRAWAL OF ARTERIAL BLOOD: CPT

## 2023-10-04 PROCEDURE — 71250 CT THORAX DX C-: CPT | Performed by: RADIOLOGY

## 2023-10-04 PROCEDURE — 71250 CT THORAX DX C-: CPT

## 2023-10-04 PROCEDURE — 84484 ASSAY OF TROPONIN QUANT: CPT | Performed by: PHYSICIAN ASSISTANT

## 2023-10-04 PROCEDURE — 87040 BLOOD CULTURE FOR BACTERIA: CPT | Performed by: NURSE PRACTITIONER

## 2023-10-04 PROCEDURE — 94799 UNLISTED PULMONARY SVC/PX: CPT

## 2023-10-04 PROCEDURE — 99284 EMERGENCY DEPT VISIT MOD MDM: CPT

## 2023-10-04 PROCEDURE — 83050 HGB METHEMOGLOBIN QUAN: CPT

## 2023-10-04 PROCEDURE — 87636 SARSCOV2 & INF A&B AMP PRB: CPT | Performed by: PHYSICIAN ASSISTANT

## 2023-10-04 PROCEDURE — 94640 AIRWAY INHALATION TREATMENT: CPT

## 2023-10-04 PROCEDURE — 85652 RBC SED RATE AUTOMATED: CPT | Performed by: PHYSICIAN ASSISTANT

## 2023-10-04 PROCEDURE — 25010000002 METHYLPREDNISOLONE PER 125 MG: Performed by: NURSE PRACTITIONER

## 2023-10-04 PROCEDURE — 36415 COLL VENOUS BLD VENIPUNCTURE: CPT

## 2023-10-04 PROCEDURE — 82805 BLOOD GASES W/O2 SATURATION: CPT

## 2023-10-04 PROCEDURE — 71046 X-RAY EXAM CHEST 2 VIEWS: CPT

## 2023-10-04 PROCEDURE — 83605 ASSAY OF LACTIC ACID: CPT | Performed by: NURSE PRACTITIONER

## 2023-10-04 RX ORDER — METHYLPREDNISOLONE SODIUM SUCCINATE 125 MG/2ML
125 INJECTION, POWDER, LYOPHILIZED, FOR SOLUTION INTRAMUSCULAR; INTRAVENOUS ONCE
Status: COMPLETED | OUTPATIENT
Start: 2023-10-04 | End: 2023-10-04

## 2023-10-04 RX ORDER — METHYLPREDNISOLONE 4 MG/1
TABLET ORAL
Qty: 1 EACH | Refills: 0 | Status: SHIPPED | OUTPATIENT
Start: 2023-10-04

## 2023-10-04 RX ORDER — DOXYCYCLINE 100 MG/1
100 CAPSULE ORAL 2 TIMES DAILY
Qty: 20 CAPSULE | Refills: 0 | Status: SHIPPED | OUTPATIENT
Start: 2023-10-04

## 2023-10-04 RX ORDER — IPRATROPIUM BROMIDE AND ALBUTEROL SULFATE 2.5; .5 MG/3ML; MG/3ML
3 SOLUTION RESPIRATORY (INHALATION) ONCE
Status: COMPLETED | OUTPATIENT
Start: 2023-10-04 | End: 2023-10-04

## 2023-10-04 RX ADMIN — METHYLPREDNISOLONE SODIUM SUCCINATE 125 MG: 125 INJECTION, POWDER, FOR SOLUTION INTRAMUSCULAR; INTRAVENOUS at 10:42

## 2023-10-04 RX ADMIN — IPRATROPIUM BROMIDE AND ALBUTEROL SULFATE 3 ML: 2.5; .5 SOLUTION RESPIRATORY (INHALATION) at 10:43

## 2023-10-04 NOTE — ED PROVIDER NOTES
Subjective   History of Present Illness  Patient is a 68-year-old white female presents emergency room today with complaint of shortness of breath.  Patient reports that she has a history of COPD and today for the last 2 weeks has been worse.  Patient reports she does have breathing treatments but denies taking any steroids.  Patient denies any alleviating or worsening factors.  Patient reports symptoms moderate.  Patient denies chest pain.    Shortness of Breath    Review of Systems   Constitutional: Negative.    HENT: Negative.     Eyes: Negative.    Respiratory:  Positive for shortness of breath.    Cardiovascular: Negative.    Gastrointestinal: Negative.    Endocrine: Negative.    Genitourinary: Negative.    Musculoskeletal: Negative.    Skin: Negative.    Allergic/Immunologic: Negative.    Neurological: Negative.    Hematological: Negative.    Psychiatric/Behavioral: Negative.       Past Medical History:   Diagnosis Date    COPD (chronic obstructive pulmonary disease)     Osteoporosis        Allergies   Allergen Reactions    Other        Past Surgical History:   Procedure Laterality Date    APPENDECTOMY       SECTION      SHOULDER SURGERY         Family History   Problem Relation Age of Onset    Heart disease Father     COPD Father     Diabetes Father        Social History     Socioeconomic History    Marital status:    Tobacco Use    Smoking status: Former     Years: 40.00     Types: Cigarettes     Quit date: 2015     Years since quittin.8    Smokeless tobacco: Never   Substance and Sexual Activity    Alcohol use: No    Drug use: No           Objective   Physical Exam  Vitals and nursing note reviewed.   Constitutional:       Appearance: She is well-developed.   HENT:      Head: Normocephalic.      Right Ear: External ear normal.      Left Ear: External ear normal.   Eyes:      Conjunctiva/sclera: Conjunctivae normal.      Pupils: Pupils are equal, round, and reactive to light.    Cardiovascular:      Rate and Rhythm: Normal rate and regular rhythm.      Heart sounds: Normal heart sounds.   Pulmonary:      Effort: Pulmonary effort is normal.      Breath sounds: Wheezing present.   Abdominal:      General: Bowel sounds are normal.      Palpations: Abdomen is soft.   Musculoskeletal:         General: Normal range of motion.      Cervical back: Normal range of motion and neck supple.   Skin:     General: Skin is warm and dry.      Capillary Refill: Capillary refill takes less than 2 seconds.   Neurological:      Mental Status: She is alert and oriented to person, place, and time.   Psychiatric:         Behavior: Behavior normal.         Thought Content: Thought content normal.       Procedures           ED Course  ED Course as of 10/04/23 1408   Wed Oct 04, 2023   0927 ECG 12 Lead Dyspnea  Normal sinus rhythm anterior septal infarct.  Rate 63  QRS 58 QTc 403  Electronically signed by Kasie Swenson DO, 10/04/23, 9:27 AM EDT.   [LK]   1255 Patient reports feeling much better.  Patient declines admission.  Patient's ABG is much improved. [ILIA]      ED Course User Index  [ILIA] Sage Flowers APRN  [LK] Kasie Swenson DO                                           Medical Decision Making  Problems Addressed:  COPD exacerbation: complicated acute illness or injury    Amount and/or Complexity of Data Reviewed  Labs: ordered.  Radiology: ordered.  ECG/medicine tests: ordered. Decision-making details documented in ED Course.    Risk  Prescription drug management.        Final diagnoses:   COPD exacerbation       ED Disposition  ED Disposition       ED Disposition   Discharge    Condition   Stable    Comment   --               Amrita Lara APRN  [address] [address]  [phone]    Schedule an appointment as soon as possible for a visit   For further evaluation         Medication List        New Prescriptions      doxycycline 100 MG capsule  Commonly known as:  MONODOX  Take 1 capsule by mouth 2 (Two) Times a Day.     methylPREDNISolone 4 MG dose pack  Commonly known as: MEDROL  Take as directed on package instructions.               Where to Get Your Medications        These medications were sent to [facility] [address] [place] [address] - [phone]  - [phone] [address] [address]      Phone: [phone]   doxycycline 100 MG capsule  methylPREDNISolone 4 MG dose pack            Sage Flowers, APRN  10/04/23 1409

## 2023-10-09 LAB
BACTERIA SPEC AEROBE CULT: NORMAL
BACTERIA SPEC AEROBE CULT: NORMAL

## 2023-11-29 ENCOUNTER — OFFICE VISIT (OUTPATIENT)
Dept: CARDIOLOGY | Facility: CLINIC | Age: 68
End: 2023-11-29
Payer: MEDICARE

## 2023-11-29 VITALS
BODY MASS INDEX: 18.32 KG/M2 | HEIGHT: 66 IN | DIASTOLIC BLOOD PRESSURE: 68 MMHG | SYSTOLIC BLOOD PRESSURE: 106 MMHG | HEART RATE: 82 BPM | OXYGEN SATURATION: 91 % | WEIGHT: 114 LBS

## 2023-11-29 DIAGNOSIS — I48.0 PAROXYSMAL ATRIAL FIBRILLATION: Primary | ICD-10-CM

## 2023-11-29 DIAGNOSIS — I47.29 NONSUSTAINED VENTRICULAR TACHYCARDIA: ICD-10-CM

## 2023-11-29 DIAGNOSIS — F17.210 CIGARETTE SMOKER: ICD-10-CM

## 2023-11-29 DIAGNOSIS — J44.9 CHRONIC OBSTRUCTIVE PULMONARY DISEASE, UNSPECIFIED COPD TYPE: ICD-10-CM

## 2023-11-29 DIAGNOSIS — I10 ESSENTIAL HYPERTENSION: ICD-10-CM

## 2023-11-29 PROCEDURE — 99214 OFFICE O/P EST MOD 30 MIN: CPT | Performed by: INTERNAL MEDICINE

## 2023-11-29 PROCEDURE — 3074F SYST BP LT 130 MM HG: CPT | Performed by: INTERNAL MEDICINE

## 2023-11-29 PROCEDURE — 3078F DIAST BP <80 MM HG: CPT | Performed by: INTERNAL MEDICINE

## 2023-11-29 NOTE — LETTER
2023     ANGELITO Combs  [address]  [place] [address]    Patient: Federica Rahman   YOB: 1955   Date of Visit: 2023       Dear ANGELITO Combs    Federica Rahman was in my office today. Below is a copy of my note.    If you have questions, please do not hesitate to call me. I look forward to following Federica along with you.         Sincerely,        Janel Aldana MD        CC: No Recipients    subjective     Chief Complaint   Patient presents with   • Atrial Fibrillation     Follow up       Problems Addressed This Visit  No diagnosis found.    History of Present Illness          Past Surgical History:   Procedure Laterality Date   • APPENDECTOMY     •  SECTION     • SHOULDER SURGERY       Family History   Problem Relation Age of Onset   • Heart disease Father    • COPD Father    • Diabetes Father      Past Medical History:   Diagnosis Date   • COPD (chronic obstructive pulmonary disease)    • Osteoporosis      Patient Active Problem List   Diagnosis   • Chronic obstructive pulmonary disease   • Hypoxia   • Sleep apnea   • Shortness of breath   • Pneumonia due to Mycoplasma pneumoniae   • Allergic rhinitis   • Hypersomnia   • Former smoker   • Cough   • Osteoporosis   • Essential hypertension   • Paroxysmal atrial fibrillation   • Idiopathic pericarditis, resolved   • Chest pain, atypical, resolved       Social History     Tobacco Use   • Smoking status: Every Day     Packs/day: 0.25     Years: 40.00     Additional pack years: 0.00     Total pack years: 10.00     Types: Cigarettes     Last attempt to quit: 2015     Years since quittin.0   • Smokeless tobacco: Never   Substance Use Topics   • Alcohol use: No   • Drug use: No       Allergies   Allergen Reactions   • Other        Current Outpatient Medications on File Prior to Visit   Medication Sig   • albuterol (PROVENTIL) (2.5 MG/3ML) 0.083% nebulizer solution USE ONE VIAL IN  "NEBULIZER EVERY 4 HOURS AS NEEDED FOR  SHORTNESS OF AIR   • amLODIPine (NORVASC) 5 MG tablet Take 1 tablet by mouth Daily.   • doxycycline (MONODOX) 100 MG capsule Take 1 capsule by mouth 2 (Two) Times a Day.   • escitalopram (LEXAPRO) 10 MG tablet Take 1 tablet by mouth Daily.   • methylPREDNISolone (MEDROL) 4 MG dose pack Take as directed on package instructions.   • metoprolol tartrate (LOPRESSOR) 25 MG tablet Take 1 tablet by mouth 2 (Two) Times a Day.   • Multiple Vitamins-Iron (QC DAILY MULTIVITAMINS/IRON) tablet Take 1 tablet by mouth daily.   • VENTOLIN  (90 BASE) MCG/ACT inhaler Inhale 2 puffs Every 4 (Four) Hours As Needed for shortness of air.   • vitamin D (ERGOCALCIFEROL) 1.25 MG (18105 UT) capsule capsule Take 1 capsule by mouth 1 (One) Time Per Week.     No current facility-administered medications on file prior to visit.         The following portions of the patient's history were reviewed and updated as appropriate: allergies, current medications, past family history, past medical history, past social history, past surgical history and problem list.    ROS       Objective:     /68 (BP Location: Left arm, Patient Position: Sitting, Cuff Size: Adult)   Pulse 82   Ht 167.6 cm (66\")   Wt 51.7 kg (114 lb)   SpO2 91% Comment: on 2 L O2  didnt have it on  BMI 18.40 kg/m²   Physical Exam      Lab Review  Lab Results   Component Value Date     10/04/2023    K 4.7 10/04/2023    CL 95 (L) 10/04/2023    BUN 14 10/04/2023    CREATININE 0.43 (L) 10/04/2023    GLUCOSE 102 (H) 10/04/2023    CALCIUM 9.0 10/04/2023    ALT 12 10/04/2023    ALKPHOS 39 10/04/2023    LABIL2 1.7 04/29/2016     Lab Results   Component Value Date    CKTOTAL 92 04/24/2016     Lab Results   Component Value Date    WBC 8.14 10/04/2023    HGB 12.8 10/04/2023    HCT 42.4 10/04/2023     10/04/2023     No results found for: \"INR\"  Lab Results   Component Value Date    MG 2.2 04/29/2016     Lab Results   Component " Value Date    TSH 1.500 08/17/2023     Lab Results   Component Value Date    BNP 19 04/21/2016     Lab Results   Component Value Date    CHOL 204 (H) 08/17/2023    TRIG 81 08/17/2023    HDL 91 (H) 08/17/2023    VLDL 14 08/17/2023    LDL 99 08/17/2023     Lab Results   Component Value Date    LDL 99 08/17/2023     Lab Results   Component Value Date    PROBNP 253.4 10/04/2023               Procedures       I personally viewed and interpreted the patient's LAB data         Assessment:   No diagnosis found.      Plan:              No follow-ups on file.

## 2023-11-29 NOTE — PROGRESS NOTES
subjective     Chief Complaint   Patient presents with    Atrial Fibrillation     Follow up       Problems Addressed This Visit  1. Paroxysmal atrial fibrillation    2. Essential hypertension    3. Nonsustained ventricular tachycardia    4. Chronic obstructive pulmonary disease, unspecified COPD type    5. Cigarette smoker        History of Present Illness    Patient is 68 years old white female who is here for cardiology follow-up.  She is doing very well and is totally asymptomatic.  She has COPD with ongoing tobacco use.  She recently went to the hospital with acute bronchitis COPD with acute exacerbation and was given prednisone and antibiotics.  She was hypoxic and hypercapnia  She stated that she is doing much better however she continues to smoke.    She has history of hypertension.  She is taking Norvasc 5 mg daily.  Blood pressure is around 110 systolic today she was advised to check her blood pressure closely and may need to decrease Norvasc.    History of paroxysmal atrial fibrillation which was felt to be secondary to pericarditis.  She was at that time in [place] where quite extensive workup was negative.  She remained in sinus rhythm and was never started on anticoagulation.  [id number] score is 2 (gender).  She is taking Norvasc however her blood pressure is 106/68 and she probably does not even need Norvasc.      Holter monitor had shown 5 beat run of nonsustained V. tach asymptomatic she has been taking Lopressor 25 twice daily and is doing very well.    Past Surgical History:   Procedure Laterality Date    APPENDECTOMY       SECTION      SHOULDER SURGERY       Family History   Problem Relation Age of Onset    Heart disease Father     COPD Father     Diabetes Father      Past Medical History:   Diagnosis Date    COPD (chronic obstructive pulmonary disease)     Osteoporosis      Patient Active Problem List   Diagnosis    Chronic obstructive pulmonary disease    Hypoxia    Sleep apnea     Shortness of breath    Pneumonia due to Mycoplasma pneumoniae    Allergic rhinitis    Hypersomnia    Cough    Osteoporosis    Essential hypertension    Paroxysmal atrial fibrillation    Idiopathic pericarditis, resolved    Chest pain, atypical, resolved    Cigarette smoker    Nonsustained ventricular tachycardia       Social History     Tobacco Use    Smoking status: Every Day     Packs/day: 0.25     Years: 40.00     Additional pack years: 0.00     Total pack years: 10.00     Types: Cigarettes     Last attempt to quit: 2015     Years since quittin.0    Smokeless tobacco: Never   Substance Use Topics    Alcohol use: No    Drug use: No       Allergies   Allergen Reactions    Other        Current Outpatient Medications on File Prior to Visit   Medication Sig    albuterol (PROVENTIL) (2.5 MG/3ML) 0.083% nebulizer solution USE ONE VIAL IN NEBULIZER EVERY 4 HOURS AS NEEDED FOR  SHORTNESS OF AIR    amLODIPine (NORVASC) 5 MG tablet Take 1 tablet by mouth Daily.    doxycycline (MONODOX) 100 MG capsule Take 1 capsule by mouth 2 (Two) Times a Day.    escitalopram (LEXAPRO) 10 MG tablet Take 1 tablet by mouth Daily.    methylPREDNISolone (MEDROL) 4 MG dose pack Take as directed on package instructions.    metoprolol tartrate (LOPRESSOR) 25 MG tablet Take 1 tablet by mouth 2 (Two) Times a Day.    Multiple Vitamins-Iron (QC DAILY MULTIVITAMINS/IRON) tablet Take 1 tablet by mouth daily.    VENTOLIN  (90 BASE) MCG/ACT inhaler Inhale 2 puffs Every 4 (Four) Hours As Needed for shortness of air.    vitamin D (ERGOCALCIFEROL) 1.25 MG (39238 UT) capsule capsule Take 1 capsule by mouth 1 (One) Time Per Week.     No current facility-administered medications on file prior to visit.         The following portions of the patient's history were reviewed and updated as appropriate: allergies, current medications, past family history, past medical history, past social history, past surgical history and problem list.    Review of  "Systems   Constitutional: Negative.   HENT: Negative.  Negative for congestion.    Eyes: Negative.    Cardiovascular: Negative.  Negative for chest pain, cyanosis, dyspnea on exertion, irregular heartbeat, leg swelling, near-syncope, orthopnea, palpitations, paroxysmal nocturnal dyspnea and syncope.   Respiratory: Negative.  Negative for shortness of breath.    Hematologic/Lymphatic: Negative.    Musculoskeletal: Negative.    Gastrointestinal: Negative.    Neurological: Negative.  Negative for headaches.          Objective:     /68 (BP Location: Left arm, Patient Position: Sitting, Cuff Size: Adult)   Pulse 82   Ht 167.6 cm (66\")   Wt 51.7 kg (114 lb)   SpO2 91% Comment: on 2 L O2  didnt have it on  BMI 18.40 kg/m²   Pulmonary:      Effort: Pulmonary effort is normal.      Breath sounds: Normal breath sounds. No stridor. No wheezing. No rhonchi. No rales.   Cardiovascular:      PMI at left midclavicular line. Normal rate. Regular rhythm. Normal S1. Normal S2.       Murmurs: There is no murmur.      No gallop.  No click. No rub.   Pulses:     Intact distal pulses.   Edema:     Peripheral edema absent.           Lab Review  Lab Results   Component Value Date     10/04/2023    K 4.7 10/04/2023    CL 95 (L) 10/04/2023    BUN 14 10/04/2023    CREATININE 0.43 (L) 10/04/2023    GLUCOSE 102 (H) 10/04/2023    CALCIUM 9.0 10/04/2023    ALT 12 10/04/2023    ALKPHOS 39 10/04/2023    LABIL2 1.7 04/29/2016     Lab Results   Component Value Date    CKTOTAL 92 04/24/2016     Lab Results   Component Value Date    WBC 8.14 10/04/2023    HGB 12.8 10/04/2023    HCT 42.4 10/04/2023     10/04/2023     No results found for: \"INR\"  Lab Results   Component Value Date    MG 2.2 04/29/2016     Lab Results   Component Value Date    TSH 1.500 08/17/2023     Lab Results   Component Value Date    BNP 19 04/21/2016     Lab Results   Component Value Date    CHOL 204 (H) 08/17/2023    TRIG 81 08/17/2023    HDL 91 (H) " 08/17/2023    VLDL 14 08/17/2023    LDL 99 08/17/2023     Lab Results   Component Value Date    LDL 99 08/17/2023     Lab Results   Component Value Date    PROBNP 253.4 10/04/2023                 ECG 12 Lead    Date/Time: 12/2/2023 3:15 PM  Performed by: Janel Aldana MD    Authorized by: Janel Aldana MD  Comparison: compared with previous ECG from 10/4/2023  Comparison to previous ECG: Right atrial enlargement is new nonspecific ST changes are more pronounced.  Rhythm: sinus rhythm  Rate: normal  BPM: 82  Conduction: conduction normal  QRS axis: normal  Other findings: non-specific ST-T wave changes and right atrial abnormality    Clinical impression: abnormal EKG             I personally viewed and interpreted the patient's LAB data         Assessment:     1. Paroxysmal atrial fibrillation     [id number] 2-3   3. Nonsustained ventricular tachycardia    4. Chronic obstructive pulmonary disease, unspecified COPD type    5. Cigarette smoker          Plan:     Patient is 68 years old white female who is here for cardiology follow-up.  She has history of transient atrial fibrillation while she was in [place] she had quite extensive cardiac workup done but she was never started on anticoagulation.  It was felt the patient's atrial fibrillation was secondary to pericarditis however for some reason she never received anticoagulation.  She has had no further palpitations.  Holter monitor did not show any atrial fibrillation    She has 5 beat run of nonsustained V. tach and is taking Lopressor doing very well at this time asymptomatic.    Recently she was in the hospital with COPD with acute exacerbation, acute bronchitis and hypercapnic hypoxic respiratory failure.  Cessation of tobacco use was stressed.  She is doing very well, O2 sat is 91% on O2 2 to 3 L/min  Patient does not think metoprolol is causing any problem she has no bronchospasms.  Will continue current medications.  Cessation of  tobacco use was again stressed.  Follow-up scheduled      No follow-ups on file.

## 2023-12-02 PROBLEM — I47.29 NONSUSTAINED VENTRICULAR TACHYCARDIA: Status: ACTIVE | Noted: 2023-12-02

## 2023-12-02 PROCEDURE — 93000 ELECTROCARDIOGRAM COMPLETE: CPT | Performed by: INTERNAL MEDICINE

## 2024-05-24 ENCOUNTER — HOSPITAL ENCOUNTER (EMERGENCY)
Facility: HOSPITAL | Age: 69
Discharge: HOME | End: 2024-05-24
Attending: STUDENT IN AN ORGANIZED HEALTH CARE EDUCATION/TRAINING PROGRAM
Payer: MEDICARE

## 2024-05-24 ENCOUNTER — APPOINTMENT (OUTPATIENT)
Dept: GENERAL RADIOLOGY | Facility: HOSPITAL | Age: 69
End: 2024-05-24
Payer: MEDICARE

## 2024-05-24 VITALS
WEIGHT: 130 LBS | SYSTOLIC BLOOD PRESSURE: 126 MMHG | OXYGEN SATURATION: 100 % | DIASTOLIC BLOOD PRESSURE: 84 MMHG | RESPIRATION RATE: 18 BRPM | HEART RATE: 79 BPM | BODY MASS INDEX: 20.89 KG/M2 | HEIGHT: 66 IN | TEMPERATURE: 97.5 F

## 2024-05-24 DIAGNOSIS — R07.9 CHEST PAIN, UNSPECIFIED TYPE: Primary | ICD-10-CM

## 2024-05-24 LAB
ALBUMIN SERPL-MCNC: 4.4 G/DL (ref 3.5–5.2)
ALBUMIN/GLOB SERPL: 1.6 G/DL
ALP SERPL-CCNC: 58 U/L (ref 39–117)
ALT SERPL W P-5'-P-CCNC: 23 U/L (ref 1–33)
ANION GAP SERPL CALCULATED.3IONS-SCNC: 8.9 MMOL/L (ref 5–15)
AST SERPL-CCNC: 21 U/L (ref 1–32)
BASOPHILS # BLD AUTO: 0.04 10*3/MM3 (ref 0–0.2)
BASOPHILS NFR BLD AUTO: 0.4 % (ref 0–1.5)
BILIRUB SERPL-MCNC: 0.2 MG/DL (ref 0–1.2)
BUN SERPL-MCNC: 15 MG/DL (ref 8–23)
BUN/CREAT SERPL: 24.2 (ref 7–25)
CALCIUM SPEC-SCNC: 9.6 MG/DL (ref 8.6–10.5)
CHLORIDE SERPL-SCNC: 99 MMOL/L (ref 98–107)
CO2 SERPL-SCNC: 31.1 MMOL/L (ref 22–29)
CREAT SERPL-MCNC: 0.62 MG/DL (ref 0.57–1)
DEPRECATED RDW RBC AUTO: 45.8 FL (ref 37–54)
EGFRCR SERPLBLD CKD-EPI 2021: 97.1 ML/MIN/1.73
EOSINOPHIL # BLD AUTO: 0.16 10*3/MM3 (ref 0–0.4)
EOSINOPHIL NFR BLD AUTO: 1.7 % (ref 0.3–6.2)
ERYTHROCYTE [DISTWIDTH] IN BLOOD BY AUTOMATED COUNT: 12.4 % (ref 12.3–15.4)
GEN 5 2HR TROPONIN T REFLEX: 7 NG/L
GLOBULIN UR ELPH-MCNC: 2.8 GM/DL
GLUCOSE SERPL-MCNC: 92 MG/DL (ref 65–99)
HCT VFR BLD AUTO: 42.8 % (ref 34–46.6)
HGB BLD-MCNC: 13.4 G/DL (ref 12–15.9)
HOLD SPECIMEN: NORMAL
HOLD SPECIMEN: NORMAL
IMM GRANULOCYTES # BLD AUTO: 0.04 10*3/MM3 (ref 0–0.05)
IMM GRANULOCYTES NFR BLD AUTO: 0.4 % (ref 0–0.5)
LYMPHOCYTES # BLD AUTO: 1.19 10*3/MM3 (ref 0.7–3.1)
LYMPHOCYTES NFR BLD AUTO: 12.9 % (ref 19.6–45.3)
MCH RBC QN AUTO: 31.1 PG (ref 26.6–33)
MCHC RBC AUTO-ENTMCNC: 31.3 G/DL (ref 31.5–35.7)
MCV RBC AUTO: 99.3 FL (ref 79–97)
MONOCYTES # BLD AUTO: 0.75 10*3/MM3 (ref 0.1–0.9)
MONOCYTES NFR BLD AUTO: 8.1 % (ref 5–12)
NEUTROPHILS NFR BLD AUTO: 7.06 10*3/MM3 (ref 1.7–7)
NEUTROPHILS NFR BLD AUTO: 76.5 % (ref 42.7–76)
NRBC BLD AUTO-RTO: 0 /100 WBC (ref 0–0.2)
PLATELET # BLD AUTO: 246 10*3/MM3 (ref 140–450)
PMV BLD AUTO: 9.3 FL (ref 6–12)
POTASSIUM SERPL-SCNC: 4.7 MMOL/L (ref 3.5–5.2)
PROT SERPL-MCNC: 7.2 G/DL (ref 6–8.5)
QT INTERVAL: 366 MS
QTC INTERVAL: 419 MS
RBC # BLD AUTO: 4.31 10*6/MM3 (ref 3.77–5.28)
SODIUM SERPL-SCNC: 139 MMOL/L (ref 136–145)
TROPONIN T DELTA: 0 NG/L
TROPONIN T SERPL HS-MCNC: 7 NG/L
WBC NRBC COR # BLD AUTO: 9.24 10*3/MM3 (ref 3.4–10.8)
WHOLE BLOOD HOLD COAG: NORMAL
WHOLE BLOOD HOLD SPECIMEN: NORMAL

## 2024-05-24 PROCEDURE — 71046 X-RAY EXAM CHEST 2 VIEWS: CPT

## 2024-05-24 PROCEDURE — 84484 ASSAY OF TROPONIN QUANT: CPT | Performed by: STUDENT IN AN ORGANIZED HEALTH CARE EDUCATION/TRAINING PROGRAM

## 2024-05-24 PROCEDURE — 93010 ELECTROCARDIOGRAM REPORT: CPT | Performed by: INTERNAL MEDICINE

## 2024-05-24 PROCEDURE — 80053 COMPREHEN METABOLIC PANEL: CPT | Performed by: STUDENT IN AN ORGANIZED HEALTH CARE EDUCATION/TRAINING PROGRAM

## 2024-05-24 PROCEDURE — 93005 ELECTROCARDIOGRAM TRACING: CPT | Performed by: STUDENT IN AN ORGANIZED HEALTH CARE EDUCATION/TRAINING PROGRAM

## 2024-05-24 PROCEDURE — 99284 EMERGENCY DEPT VISIT MOD MDM: CPT

## 2024-05-24 PROCEDURE — 71046 X-RAY EXAM CHEST 2 VIEWS: CPT | Performed by: RADIOLOGY

## 2024-05-24 PROCEDURE — 85025 COMPLETE CBC W/AUTO DIFF WBC: CPT | Performed by: STUDENT IN AN ORGANIZED HEALTH CARE EDUCATION/TRAINING PROGRAM

## 2024-05-24 PROCEDURE — 36415 COLL VENOUS BLD VENIPUNCTURE: CPT

## 2024-05-24 RX ORDER — ROSUVASTATIN CALCIUM 20 MG/1
20 TABLET, COATED ORAL DAILY
Qty: 30 TABLET | Refills: 0 | Status: SHIPPED | OUTPATIENT
Start: 2024-05-24

## 2024-05-24 RX ORDER — ASPIRIN 325 MG
325 TABLET ORAL ONCE
Status: DISCONTINUED | OUTPATIENT
Start: 2024-05-24 | End: 2024-05-24 | Stop reason: HOSPADM

## 2024-05-24 RX ORDER — NITROGLYCERIN 0.4 MG/1
0.4 TABLET SUBLINGUAL
Qty: 25 TABLET | Refills: 0 | Status: SHIPPED | OUTPATIENT
Start: 2024-05-24

## 2024-05-24 RX ORDER — SODIUM CHLORIDE 0.9 % (FLUSH) 0.9 %
10 SYRINGE (ML) INJECTION AS NEEDED
Status: DISCONTINUED | OUTPATIENT
Start: 2024-05-24 | End: 2024-05-24 | Stop reason: HOSPADM

## 2024-05-24 NOTE — ED TRIAGE NOTES
MEDICAL SCREENING:    Reason for Visit: chest pain    Patient initially seen in triage.  The patient was advised further evaluation and diagnostic testing will be needed, some of the treatment and testing will be initiated in the lobby in order to begin the process.  The patient will be returned to the waiting area for the time being and possibly be re-assessed by a subsequent ED provider.  The patient will be brought back to the treatment area in as timely manner as possible.

## 2024-05-24 NOTE — ED PROVIDER NOTES
Subjective   History of Present Illness  68-year-old female with past medical history of oxygen dependent COPD (2L), osteoporosis, paroxysmal atrial fibrillation not chronically anticoagulated, and hypertension presents to the emergency room with chest pain x 2 days.  Patient tells me over the past couple of months she has fallen on several different occasions and thinks that her most recent fall may be contributing to her chest pain.  She denies any chest pressure, tightness, or squeezing and at present time tells me that she is chest pain-free.  She does follow with Dr. Turner, cardiologist and reports most recent stress test and echocardiogram being over a year ago in [place].  She denies any shortness of breath, nausea, vomiting, back pain, diaphoresis, or abdominal pain.  She denies any specific aggravating or alleviating factors.  Denies being around any sick contacts or recent travel.  Denies any other complaints or concerns at this time.    Per Dr. Turner's note it appears that patient had an extensive cardiac workup in [place] where she underwent a cardiac PET scan which did not evidence any cardiac ischemia in .    History provided by:  Patient   used: No        Review of Systems   Constitutional: Negative.  Negative for fever.   HENT: Negative.     Respiratory: Negative.     Cardiovascular:  Positive for chest pain.   Gastrointestinal: Negative.  Negative for abdominal pain.   Endocrine: Negative.    Genitourinary: Negative.  Negative for dysuria.   Skin: Negative.    Neurological: Negative.    Psychiatric/Behavioral: Negative.     All other systems reviewed and are negative.      Past Medical History:   Diagnosis Date    COPD (chronic obstructive pulmonary disease)     Osteoporosis        Allergies   Allergen Reactions    Other        Past Surgical History:   Procedure Laterality Date    APPENDECTOMY       SECTION      SHOULDER SURGERY         Family History    Problem Relation Age of Onset    Heart disease Father     COPD Father     Diabetes Father        Social History     Socioeconomic History    Marital status:    Tobacco Use    Smoking status: Every Day     Current packs/day: 0.00     Average packs/day: 0.3 packs/day for 40.0 years (10.0 ttl pk-yrs)     Types: Cigarettes     Start date: 1975     Last attempt to quit: 2015     Years since quittin.4    Smokeless tobacco: Never   Substance and Sexual Activity    Alcohol use: No    Drug use: No           Objective   Physical Exam  Vitals and nursing note reviewed.   Constitutional:       General: She is not in acute distress.     Appearance: She is well-developed. She is not diaphoretic.      Interventions: Nasal cannula in place.      Comments: 2L   HENT:      Head: Normocephalic and atraumatic.      Right Ear: External ear normal.      Left Ear: External ear normal.      Nose: Nose normal.   Eyes:      Conjunctiva/sclera: Conjunctivae normal.      Pupils: Pupils are equal, round, and reactive to light.   Neck:      Vascular: No JVD.      Trachea: No tracheal deviation.   Cardiovascular:      Rate and Rhythm: Normal rate and regular rhythm.      Heart sounds: Normal heart sounds. No murmur heard.  Pulmonary:      Effort: Pulmonary effort is normal. No respiratory distress.      Breath sounds: Normal breath sounds. No wheezing.   Abdominal:      General: Bowel sounds are normal.      Palpations: Abdomen is soft.      Tenderness: There is no abdominal tenderness.   Musculoskeletal:         General: No deformity. Normal range of motion.      Cervical back: Normal range of motion and neck supple.   Skin:     General: Skin is warm and dry.      Coloration: Skin is not pale.      Findings: No erythema or rash.   Neurological:      Mental Status: She is alert and oriented to person, place, and time.      Cranial Nerves: No cranial nerve deficit.   Psychiatric:         Behavior: Behavior normal.          Thought Content: Thought content normal.         Procedures           ED Course  ED Course as of 05/24/24 1952   Fri May 24, 2024   1119 ECG 12 Lead ED Triage Standing Order; Chest Pain  Sinus rhythm, rate 79, QTc 419, no acute ST or T wave changes [CW]   1625 XR Chest 2 View [TK]   1658 Patient remains chest pain free at this time. I have discussed workup with her and she prefers to do cardiac workup as an outpatient. She follows with Dr. Aldana, cardiologist. Will send order for outpatient stress test and have encouraged patient to return to the ER with new or worsening chest pain, otherwise will follow up with cardiologist. [TK]   1720 Discussed pt with Dr. Jones who is agreeable with plan and recommends adding Crestor 20mg and PRN NTG SL tabs. [TK]      ED Course User Index  [CW] Parker Baird DO  [TK] Dee Quinn, ISAMAR                HEART Score: 4                    Results for orders placed or performed during the hospital encounter of 05/24/24   Comprehensive Metabolic Panel    Specimen: Hand, Right; Blood   Result Value Ref Range    Glucose 92 65 - 99 mg/dL    BUN 15 8 - 23 mg/dL    Creatinine 0.62 0.57 - 1.00 mg/dL    Sodium 139 136 - 145 mmol/L    Potassium 4.7 3.5 - 5.2 mmol/L    Chloride 99 98 - 107 mmol/L    CO2 31.1 (H) 22.0 - 29.0 mmol/L    Calcium 9.6 8.6 - 10.5 mg/dL    Total Protein 7.2 6.0 - 8.5 g/dL    Albumin 4.4 3.5 - 5.2 g/dL    ALT (SGPT) 23 1 - 33 U/L    AST (SGOT) 21 1 - 32 U/L    Alkaline Phosphatase 58 39 - 117 U/L    Total Bilirubin 0.2 0.0 - 1.2 mg/dL    Globulin 2.8 gm/dL    A/G Ratio 1.6 g/dL    BUN/Creatinine Ratio 24.2 7.0 - 25.0    Anion Gap 8.9 5.0 - 15.0 mmol/L    eGFR 97.1 >60.0 mL/min/1.73   High Sensitivity Troponin T    Specimen: Hand, Right; Blood   Result Value Ref Range    HS Troponin T 7 <14 ng/L   CBC Auto Differential    Specimen: Hand, Right; Blood   Result Value Ref Range    WBC 9.24 3.40 - 10.80 10*3/mm3    RBC 4.31 3.77 - 5.28 10*6/mm3     Hemoglobin 13.4 12.0 - 15.9 g/dL    Hematocrit 42.8 34.0 - 46.6 %    MCV 99.3 (H) 79.0 - 97.0 fL    MCH 31.1 26.6 - 33.0 pg    MCHC 31.3 (L) 31.5 - 35.7 g/dL    RDW 12.4 12.3 - 15.4 %    RDW-SD 45.8 37.0 - 54.0 fl    MPV 9.3 6.0 - 12.0 fL    Platelets 246 140 - 450 10*3/mm3    Neutrophil % 76.5 (H) 42.7 - 76.0 %    Lymphocyte % 12.9 (L) 19.6 - 45.3 %    Monocyte % 8.1 5.0 - 12.0 %    Eosinophil % 1.7 0.3 - 6.2 %    Basophil % 0.4 0.0 - 1.5 %    Immature Grans % 0.4 0.0 - 0.5 %    Neutrophils, Absolute 7.06 (H) 1.70 - 7.00 10*3/mm3    Lymphocytes, Absolute 1.19 0.70 - 3.10 10*3/mm3    Monocytes, Absolute 0.75 0.10 - 0.90 10*3/mm3    Eosinophils, Absolute 0.16 0.00 - 0.40 10*3/mm3    Basophils, Absolute 0.04 0.00 - 0.20 10*3/mm3    Immature Grans, Absolute 0.04 0.00 - 0.05 10*3/mm3    nRBC 0.0 0.0 - 0.2 /100 WBC   High Sensitivity Troponin T 2Hr    Specimen: Blood   Result Value Ref Range    HS Troponin T 7 <14 ng/L    Troponin T Delta 0 >=-4 - <+4 ng/L   ECG 12 Lead ED Triage Standing Order; Chest Pain   Result Value Ref Range    QT Interval 366 ms    QTC Interval 419 ms   Green Top (Gel)   Result Value Ref Range    Extra Tube Hold for add-ons.    Lavender Top   Result Value Ref Range    Extra Tube hold for add-on    Gold Top - SST   Result Value Ref Range    Extra Tube Hold for add-ons.    Light Blue Top   Result Value Ref Range    Extra Tube Hold for add-ons.        XR Chest 2 View   Final Result   No acute cardiopulmonary process.           This report was finalized on 5/24/2024 12:05 PM by Stef Ribeiro M.D..                        Medical Decision Making  Problems Addressed:  Chest pain, unspecified type: complicated acute illness or injury    Amount and/or Complexity of Data Reviewed  Labs: ordered.  Radiology: ordered. Decision-making details documented in ED Course.  ECG/medicine tests: ordered. Decision-making details documented in ED Course.    Risk  OTC drugs.  Prescription drug  management.        Final diagnoses:   Chest pain, unspecified type       ED Disposition  ED Disposition       ED Disposition   Discharge    Condition   Stable    Comment   --               Bhupendra Lara, ANGELITO  [address]  [facility] [address]  [place] [address]  [phone]    In 2 days      Janel Aldana MD  [address] [place] [address]  [phone]    In 2 days           Medication List        New Prescriptions      nitroglycerin 0.4 MG SL tablet  Commonly known as: NITROSTAT  Place 1 tablet under the tongue Every 5 (Five) Minutes As Needed for Chest Pain. Take no more than 3 doses in 15 minutes.     rosuvastatin 20 MG tablet  Commonly known as: Crestor  Take 1 tablet by mouth Daily.               Where to Get Your Medications        These medications were sent to [facility] [address] [place] [address] [phone]  - [phone] [place] [address]      Phone: [phone]   nitroglycerin 0.4 MG SL tablet  rosuvastatin 20 MG tablet            Dee Quinn PADeanneC  05/24/24 1952

## 2024-05-28 ENCOUNTER — TRANSCRIBE ORDERS (OUTPATIENT)
Dept: ADMINISTRATIVE | Facility: HOSPITAL | Age: 69
End: 2024-05-28
Payer: MEDICARE

## 2024-05-28 DIAGNOSIS — R07.9 CHEST PAIN, UNSPECIFIED TYPE: Primary | ICD-10-CM

## 2024-06-03 ENCOUNTER — HOSPITAL ENCOUNTER (OUTPATIENT)
Dept: NUCLEAR MEDICINE | Facility: HOSPITAL | Age: 69
Discharge: HOME | End: 2024-06-03
Payer: MEDICARE

## 2024-06-03 ENCOUNTER — HOSPITAL ENCOUNTER (OUTPATIENT)
Dept: CARDIOLOGY | Facility: HOSPITAL | Age: 69
Discharge: HOME | End: 2024-06-03
Payer: MEDICARE

## 2024-06-03 DIAGNOSIS — R07.9 CHEST PAIN, UNSPECIFIED TYPE: ICD-10-CM

## 2024-06-03 LAB
BH CV NUCLEAR PRIOR STUDY: 3
BH CV REST NUCLEAR ISOTOPE DOSE: 9.4 MCI
BH CV STRESS BP STAGE 1: NORMAL
BH CV STRESS COMMENTS STAGE 1: NORMAL
BH CV STRESS DOSE REGADENOSON STAGE 1: 0.4
BH CV STRESS DURATION MIN STAGE 1: 0
BH CV STRESS DURATION SEC STAGE 1: 10
BH CV STRESS HR STAGE 1: 100
BH CV STRESS NUCLEAR ISOTOPE DOSE: 29.5 MCI
BH CV STRESS PROTOCOL 1: NORMAL
BH CV STRESS RECOVERY BP: NORMAL MMHG
BH CV STRESS RECOVERY HR: 96 BPM
BH CV STRESS STAGE 1: 1
LV EF NUC BP: 79 %
MAXIMAL PREDICTED HEART RATE: 152 BPM
PERCENT MAX PREDICTED HR: 65.79 %
STRESS BASELINE BP: NORMAL MMHG
STRESS BASELINE HR: 65 BPM
STRESS PERCENT HR: 77 %
STRESS POST PEAK BP: NORMAL MMHG
STRESS POST PEAK HR: 100 BPM
STRESS TARGET HR: 129 BPM

## 2024-06-03 PROCEDURE — A9500 TC99M SESTAMIBI: HCPCS | Performed by: INTERNAL MEDICINE

## 2024-06-03 PROCEDURE — 78452 HT MUSCLE IMAGE SPECT MULT: CPT

## 2024-06-03 PROCEDURE — 93018 CV STRESS TEST I&R ONLY: CPT | Performed by: INTERNAL MEDICINE

## 2024-06-03 PROCEDURE — 78452 HT MUSCLE IMAGE SPECT MULT: CPT | Performed by: INTERNAL MEDICINE

## 2024-06-03 PROCEDURE — 93017 CV STRESS TEST TRACING ONLY: CPT

## 2024-06-03 PROCEDURE — 0 TECHNETIUM SESTAMIBI: Performed by: INTERNAL MEDICINE

## 2024-06-03 PROCEDURE — 25010000002 REGADENOSON 0.4 MG/5ML SOLUTION: Performed by: INTERNAL MEDICINE

## 2024-06-03 RX ORDER — REGADENOSON 0.08 MG/ML
0.4 INJECTION, SOLUTION INTRAVENOUS
Status: COMPLETED | OUTPATIENT
Start: 2024-06-03 | End: 2024-06-03

## 2024-06-03 RX ADMIN — TECHNETIUM TC 99M SESTAMIBI 1 DOSE: 1 INJECTION INTRAVENOUS at 09:50

## 2024-06-03 RX ADMIN — REGADENOSON 0.4 MG: 0.08 INJECTION, SOLUTION INTRAVENOUS at 09:50

## 2024-06-03 RX ADMIN — TECHNETIUM TC 99M SESTAMIBI 1 DOSE: 1 INJECTION INTRAVENOUS at 08:36

## 2024-06-04 ENCOUNTER — OFFICE VISIT (OUTPATIENT)
Dept: CARDIOLOGY | Facility: CLINIC | Age: 69
End: 2024-06-04
Payer: MEDICARE

## 2024-06-04 VITALS
BODY MASS INDEX: 19.61 KG/M2 | WEIGHT: 122 LBS | HEART RATE: 87 BPM | SYSTOLIC BLOOD PRESSURE: 110 MMHG | HEIGHT: 66 IN | OXYGEN SATURATION: 95 % | DIASTOLIC BLOOD PRESSURE: 62 MMHG

## 2024-06-04 DIAGNOSIS — I48.0 PAROXYSMAL ATRIAL FIBRILLATION: ICD-10-CM

## 2024-06-04 DIAGNOSIS — I10 ESSENTIAL HYPERTENSION: Primary | ICD-10-CM

## 2024-06-04 DIAGNOSIS — E78.2 MIXED HYPERLIPIDEMIA: ICD-10-CM

## 2024-06-04 PROCEDURE — 3074F SYST BP LT 130 MM HG: CPT | Performed by: INTERNAL MEDICINE

## 2024-06-04 PROCEDURE — 3078F DIAST BP <80 MM HG: CPT | Performed by: INTERNAL MEDICINE

## 2024-06-04 PROCEDURE — 99214 OFFICE O/P EST MOD 30 MIN: CPT | Performed by: INTERNAL MEDICINE

## 2024-06-04 RX ORDER — FLUTICASONE FUROATE AND VILANTEROL TRIFENATATE 100; 25 UG/1; UG/1
1 POWDER RESPIRATORY (INHALATION) DAILY
COMMUNITY
Start: 2024-05-29

## 2024-06-04 RX ORDER — PREDNISONE 5 MG/1
1 TABLET ORAL SEE ADMIN INSTRUCTIONS
COMMUNITY
Start: 2024-05-07

## 2024-06-04 NOTE — PROGRESS NOTES
subjective     Chief Complaint   Patient presents with    Atrial Fibrillation    Hospital Follow Up Visit     No symptoms since       Problems Addressed This Visit  1. Essential hypertension    2. Paroxysmal atrial fibrillation    3. Mixed hyperlipidemia        History of Present Illness  Federica is 68 years old white female who recently went to the emergency room because of chest pain.  She underwent a stress test which was done yesterday and was negative for significant exercise-induced myocardial ischemia.  She has mild hyperlipidemia and was started on Crestor.  No drug side effects.    Blood pressure is very well-controlled.  She is taking Norvasc 5 mg daily along with Lopressor 25 twice daily.    Paroxysmal atrial fibrillation she is maintaining sinus rhythm and is taking Lopressor 25 twice daily.    Patient has COPD and is taking Breo Ellipta, prednisone and Proventil HFA along with Proventil nebulizer treatment        Past Surgical History:   Procedure Laterality Date    APPENDECTOMY       SECTION      SHOULDER SURGERY       Family History   Problem Relation Age of Onset    Heart disease Father     COPD Father     Diabetes Father      Past Medical History:   Diagnosis Date    COPD (chronic obstructive pulmonary disease)     Osteoporosis      Patient Active Problem List   Diagnosis    Chronic obstructive pulmonary disease    Hypoxia    Sleep apnea    Shortness of breath    Pneumonia due to Mycoplasma pneumoniae    Allergic rhinitis    Hypersomnia    Cough    Osteoporosis    Essential hypertension    Paroxysmal atrial fibrillation    Idiopathic pericarditis, resolved    Chest pain, atypical, resolved    Cigarette smoker    Nonsustained ventricular tachycardia    Mixed hyperlipidemia       Social History     Tobacco Use    Smoking status: Every Day     Current packs/day: 0.00     Average packs/day: 0.3 packs/day for 40.0 years (10.0 ttl pk-yrs)     Types: Cigarettes     Start date: 1975     Last  attempt to quit: 2015     Years since quittin.5    Smokeless tobacco: Never   Substance Use Topics    Alcohol use: No    Drug use: No       Allergies   Allergen Reactions    Other        Current Outpatient Medications on File Prior to Visit   Medication Sig    albuterol (PROVENTIL) (2.5 MG/3ML) 0.083% nebulizer solution USE ONE VIAL IN NEBULIZER EVERY 4 HOURS AS NEEDED FOR  SHORTNESS OF AIR    amLODIPine (NORVASC) 5 MG tablet Take 1 tablet by mouth Daily.    Breo Ellipta 100-25 MCG/ACT aerosol powder  Inhale 1 puff Daily.    doxycycline (MONODOX) 100 MG capsule Take 1 capsule by mouth 2 (Two) Times a Day.    escitalopram (LEXAPRO) 10 MG tablet Take 1 tablet by mouth Daily.    metoprolol tartrate (LOPRESSOR) 25 MG tablet Take 1 tablet by mouth 2 (Two) Times a Day.    Multiple Vitamins-Iron (QC DAILY MULTIVITAMINS/IRON) tablet Take 1 tablet by mouth daily.    nitroglycerin (NITROSTAT) 0.4 MG SL tablet Place 1 tablet under the tongue Every 5 (Five) Minutes As Needed for Chest Pain. Take no more than 3 doses in 15 minutes.    predniSONE 5 MG (21) tablet therapy pack dose pack 1 tablet See Admin Instructions.    rosuvastatin (Crestor) 20 MG tablet Take 1 tablet by mouth Daily.    VENTOLIN  (90 BASE) MCG/ACT inhaler Inhale 2 puffs Every 4 (Four) Hours As Needed for shortness of air.    vitamin D (ERGOCALCIFEROL) 1.25 MG (65388 UT) capsule capsule Take 1 capsule by mouth 1 (One) Time Per Week.    [DISCONTINUED] methylPREDNISolone (MEDROL) 4 MG dose pack Take as directed on package instructions.     No current facility-administered medications on file prior to visit.         The following portions of the patient's history were reviewed and updated as appropriate: allergies, current medications, past family history, past medical history, past social history, past surgical history and problem list.    Review of Systems   Constitutional: Negative.   HENT: Negative.  Negative for congestion.    Eyes: Negative.   "  Cardiovascular: Negative.  Negative for chest pain, cyanosis, dyspnea on exertion, irregular heartbeat, leg swelling, near-syncope, orthopnea, palpitations, paroxysmal nocturnal dyspnea and syncope.   Respiratory: Negative.  Negative for shortness of breath.    Hematologic/Lymphatic: Negative.    Musculoskeletal: Negative.    Gastrointestinal: Negative.    Neurological: Negative.  Negative for headaches.          Objective:     /62 (BP Location: Left arm, Patient Position: Sitting, Cuff Size: Adult)   Pulse 87   Ht 167.6 cm (66\")   Wt 55.3 kg (122 lb)   SpO2 95%   BMI 19.69 kg/m²   Pulmonary:      Breath sounds: Rhonchi present.   Cardiovascular:      PMI at left midclavicular line. Normal rate. Regular rhythm. Normal S1. Normal S2.       Murmurs: There is no murmur.      No gallop.  No click. No rub.   Pulses:     Intact distal pulses.   Edema:     Peripheral edema absent.           Lab Review  Lab Results   Component Value Date     05/24/2024    K 4.7 05/24/2024    CL 99 05/24/2024    BUN 15 05/24/2024    CREATININE 0.62 05/24/2024    GLUCOSE 92 05/24/2024    CALCIUM 9.6 05/24/2024    ALT 23 05/24/2024    ALKPHOS 58 05/24/2024    LABIL2 1.7 04/29/2016     Lab Results   Component Value Date    CKTOTAL 92 04/24/2016     Lab Results   Component Value Date    WBC 9.24 05/24/2024    HGB 13.4 05/24/2024    HCT 42.8 05/24/2024     05/24/2024     No results found for: \"INR\"  Lab Results   Component Value Date    MG 2.2 04/29/2016     Lab Results   Component Value Date    TSH 1.500 08/17/2023     Lab Results   Component Value Date    BNP 19 04/21/2016     Lab Results   Component Value Date    CHOL 204 (H) 08/17/2023    TRIG 81 08/17/2023    HDL 91 (H) 08/17/2023    VLDL 14 08/17/2023    LDL 99 08/17/2023     Lab Results   Component Value Date    LDL 99 08/17/2023     Lab Results   Component Value Date    PROBNP 253.4 10/04/2023               Procedures       I personally viewed and interpreted the " patient's LAB data         Assessment:     1. Essential hypertension    2. Paroxysmal atrial fibrillation    3. Mixed hyperlipidemia          Plan:     Hypertension  Blood pressure is very well-controlled.  No change in therapy was made.      History of paroxysmal atrial fibrillation which was felt to be secondary to pericarditis.  She was at that time in [place] where quite extensive workup was negative.  She remained in sinus rhythm and was never started on anticoagulation.  [id number] score is 2 (gender).  She was not started on anticoagulation at that time and has not had any further episode of atrial fibrillation.    Holter monitor had shown 5 beat run of nonsustained V. tach asymptomatic she has been taking Lopressor 25 twice daily and is doing very well.    Stress test is negative for significant exercise-induced myocardial ischemia.    She is not taking Crestor without any side effects.    Cessation of tobacco use stressed.  Follow-up scheduled      No follow-ups on file.

## 2024-06-04 NOTE — LETTER
June 4, 2024     ANGELITO Reyes  [address]  [place] [address]    Patient: Federica Rahman   YOB: 1955   Date of Visit: 6/4/2024       Dear ANGELITO Reyes    Federica Rahman was in my office today. Below is a copy of my note.    If you have questions, please do not hesitate to call me. I look forward to following Federica along with you.         Sincerely,        Janel Aldana MD        CC: No Recipients    No notes on file

## 2024-06-05 ENCOUNTER — TELEPHONE (OUTPATIENT)
Dept: CARDIOLOGY | Facility: CLINIC | Age: 69
End: 2024-06-05
Payer: MEDICARE

## 2024-06-05 NOTE — TELEPHONE ENCOUNTER
Spoke to patient regarding normal stress results per Dr Carson's request. Patient voiced understanding and will follow up with Dr Aldana.

## 2024-06-05 NOTE — TELEPHONE ENCOUNTER
----- Message from Judy RENTERIA sent at 6/4/2024  9:53 AM EDT -----  L/M FOR THE PATIENT TO CALL THE OFFICE FOR TEST RESULTS.

## 2024-08-28 ENCOUNTER — APPOINTMENT (OUTPATIENT)
Dept: GENERAL RADIOLOGY | Facility: HOSPITAL | Age: 69
End: 2024-08-28
Payer: MEDICARE

## 2024-08-28 ENCOUNTER — HOSPITAL ENCOUNTER (EMERGENCY)
Facility: HOSPITAL | Age: 69
Discharge: HOME | End: 2024-08-28
Attending: EMERGENCY MEDICINE
Payer: MEDICARE

## 2024-08-28 VITALS
RESPIRATION RATE: 24 BRPM | SYSTOLIC BLOOD PRESSURE: 124 MMHG | BODY MASS INDEX: 19.29 KG/M2 | WEIGHT: 120 LBS | HEIGHT: 66 IN | TEMPERATURE: 97.7 F | DIASTOLIC BLOOD PRESSURE: 60 MMHG | OXYGEN SATURATION: 97 % | HEART RATE: 86 BPM

## 2024-08-28 DIAGNOSIS — J44.1 COPD WITH ACUTE EXACERBATION: Primary | ICD-10-CM

## 2024-08-28 LAB
ALBUMIN SERPL-MCNC: 3.9 G/DL (ref 3.5–5.2)
ALBUMIN/GLOB SERPL: 1.6 G/DL
ALP SERPL-CCNC: 47 U/L (ref 39–117)
ALT SERPL W P-5'-P-CCNC: 9 U/L (ref 1–33)
ANION GAP SERPL CALCULATED.3IONS-SCNC: 5.9 MMOL/L (ref 5–15)
APTT PPP: 29.5 SECONDS (ref 26.5–34.5)
AST SERPL-CCNC: 13 U/L (ref 1–32)
BASOPHILS # BLD AUTO: 0.06 10*3/MM3 (ref 0–0.2)
BASOPHILS NFR BLD AUTO: 0.9 % (ref 0–1.5)
BILIRUB SERPL-MCNC: 0.3 MG/DL (ref 0–1.2)
BUN SERPL-MCNC: 16 MG/DL (ref 8–23)
BUN/CREAT SERPL: 37.2 (ref 7–25)
CALCIUM SPEC-SCNC: 9.4 MG/DL (ref 8.6–10.5)
CHLORIDE SERPL-SCNC: 98 MMOL/L (ref 98–107)
CO2 SERPL-SCNC: 34.1 MMOL/L (ref 22–29)
CREAT SERPL-MCNC: 0.43 MG/DL (ref 0.57–1)
CRP SERPL-MCNC: <0.3 MG/DL (ref 0–0.5)
D-LACTATE SERPL-SCNC: 1.3 MMOL/L (ref 0.5–2)
DEPRECATED RDW RBC AUTO: 45.5 FL (ref 37–54)
EGFRCR SERPLBLD CKD-EPI 2021: 106.1 ML/MIN/1.73
EOSINOPHIL # BLD AUTO: 0.17 10*3/MM3 (ref 0–0.4)
EOSINOPHIL NFR BLD AUTO: 2.6 % (ref 0.3–6.2)
ERYTHROCYTE [DISTWIDTH] IN BLOOD BY AUTOMATED COUNT: 12.4 % (ref 12.3–15.4)
FLUAV RNA RESP QL NAA+PROBE: NOT DETECTED
FLUBV RNA RESP QL NAA+PROBE: NOT DETECTED
GLOBULIN UR ELPH-MCNC: 2.5 GM/DL
GLUCOSE SERPL-MCNC: 108 MG/DL (ref 65–99)
HCT VFR BLD AUTO: 40.2 % (ref 34–46.6)
HGB BLD-MCNC: 12.2 G/DL (ref 12–15.9)
HOLD SPECIMEN: NORMAL
HOLD SPECIMEN: NORMAL
IMM GRANULOCYTES # BLD AUTO: 0.01 10*3/MM3 (ref 0–0.05)
IMM GRANULOCYTES NFR BLD AUTO: 0.2 % (ref 0–0.5)
INR PPP: 0.84 (ref 0.9–1.1)
LYMPHOCYTES # BLD AUTO: 1.54 10*3/MM3 (ref 0.7–3.1)
LYMPHOCYTES NFR BLD AUTO: 23.2 % (ref 19.6–45.3)
MAGNESIUM SERPL-MCNC: 1.9 MG/DL (ref 1.6–2.4)
MCH RBC QN AUTO: 30 PG (ref 26.6–33)
MCHC RBC AUTO-ENTMCNC: 30.3 G/DL (ref 31.5–35.7)
MCV RBC AUTO: 99 FL (ref 79–97)
MONOCYTES # BLD AUTO: 0.68 10*3/MM3 (ref 0.1–0.9)
MONOCYTES NFR BLD AUTO: 10.2 % (ref 5–12)
NEUTROPHILS NFR BLD AUTO: 4.19 10*3/MM3 (ref 1.7–7)
NEUTROPHILS NFR BLD AUTO: 62.9 % (ref 42.7–76)
NRBC BLD AUTO-RTO: 0 /100 WBC (ref 0–0.2)
NT-PROBNP SERPL-MCNC: 98.7 PG/ML (ref 0–900)
PLATELET # BLD AUTO: 266 10*3/MM3 (ref 140–450)
PMV BLD AUTO: 9.3 FL (ref 6–12)
POTASSIUM SERPL-SCNC: 4.3 MMOL/L (ref 3.5–5.2)
PROT SERPL-MCNC: 6.4 G/DL (ref 6–8.5)
PROTHROMBIN TIME: 11.7 SECONDS (ref 12.1–14.7)
QT INTERVAL: 364 MS
QTC INTERVAL: 430 MS
RBC # BLD AUTO: 4.06 10*6/MM3 (ref 3.77–5.28)
SARS-COV-2 RNA RESP QL NAA+PROBE: NOT DETECTED
SODIUM SERPL-SCNC: 138 MMOL/L (ref 136–145)
TROPONIN T SERPL HS-MCNC: 8 NG/L
WBC NRBC COR # BLD AUTO: 6.65 10*3/MM3 (ref 3.4–10.8)
WHOLE BLOOD HOLD COAG: NORMAL
WHOLE BLOOD HOLD SPECIMEN: NORMAL

## 2024-08-28 PROCEDURE — 85610 PROTHROMBIN TIME: CPT | Performed by: PHYSICIAN ASSISTANT

## 2024-08-28 PROCEDURE — 94640 AIRWAY INHALATION TREATMENT: CPT

## 2024-08-28 PROCEDURE — 87636 SARSCOV2 & INF A&B AMP PRB: CPT | Performed by: PHYSICIAN ASSISTANT

## 2024-08-28 PROCEDURE — 94799 UNLISTED PULMONARY SVC/PX: CPT

## 2024-08-28 PROCEDURE — 96374 THER/PROPH/DIAG INJ IV PUSH: CPT

## 2024-08-28 PROCEDURE — 71045 X-RAY EXAM CHEST 1 VIEW: CPT | Performed by: RADIOLOGY

## 2024-08-28 PROCEDURE — 80053 COMPREHEN METABOLIC PANEL: CPT | Performed by: EMERGENCY MEDICINE

## 2024-08-28 PROCEDURE — 99284 EMERGENCY DEPT VISIT MOD MDM: CPT

## 2024-08-28 PROCEDURE — 86140 C-REACTIVE PROTEIN: CPT | Performed by: PHYSICIAN ASSISTANT

## 2024-08-28 PROCEDURE — 93005 ELECTROCARDIOGRAM TRACING: CPT | Performed by: EMERGENCY MEDICINE

## 2024-08-28 PROCEDURE — 71045 X-RAY EXAM CHEST 1 VIEW: CPT

## 2024-08-28 PROCEDURE — 87040 BLOOD CULTURE FOR BACTERIA: CPT | Performed by: PHYSICIAN ASSISTANT

## 2024-08-28 PROCEDURE — 83880 ASSAY OF NATRIURETIC PEPTIDE: CPT | Performed by: EMERGENCY MEDICINE

## 2024-08-28 PROCEDURE — 85730 THROMBOPLASTIN TIME PARTIAL: CPT | Performed by: PHYSICIAN ASSISTANT

## 2024-08-28 PROCEDURE — 83605 ASSAY OF LACTIC ACID: CPT | Performed by: PHYSICIAN ASSISTANT

## 2024-08-28 PROCEDURE — 25010000002 METHYLPREDNISOLONE PER 125 MG: Performed by: PHYSICIAN ASSISTANT

## 2024-08-28 PROCEDURE — 85025 COMPLETE CBC W/AUTO DIFF WBC: CPT | Performed by: EMERGENCY MEDICINE

## 2024-08-28 PROCEDURE — 83735 ASSAY OF MAGNESIUM: CPT | Performed by: PHYSICIAN ASSISTANT

## 2024-08-28 PROCEDURE — 84484 ASSAY OF TROPONIN QUANT: CPT | Performed by: EMERGENCY MEDICINE

## 2024-08-28 PROCEDURE — 36415 COLL VENOUS BLD VENIPUNCTURE: CPT

## 2024-08-28 RX ORDER — PREDNISONE 50 MG/1
50 TABLET ORAL DAILY
Qty: 5 TABLET | Refills: 0 | Status: SHIPPED | OUTPATIENT
Start: 2024-08-28

## 2024-08-28 RX ORDER — METHYLPREDNISOLONE SODIUM SUCCINATE 125 MG/2ML
125 INJECTION, POWDER, LYOPHILIZED, FOR SOLUTION INTRAMUSCULAR; INTRAVENOUS ONCE
Status: COMPLETED | OUTPATIENT
Start: 2024-08-28 | End: 2024-08-28

## 2024-08-28 RX ORDER — IPRATROPIUM BROMIDE AND ALBUTEROL SULFATE 2.5; .5 MG/3ML; MG/3ML
3 SOLUTION RESPIRATORY (INHALATION)
Status: COMPLETED | OUTPATIENT
Start: 2024-08-28 | End: 2024-08-28

## 2024-08-28 RX ORDER — SODIUM CHLORIDE 0.9 % (FLUSH) 0.9 %
10 SYRINGE (ML) INJECTION AS NEEDED
Status: DISCONTINUED | OUTPATIENT
Start: 2024-08-28 | End: 2024-08-28 | Stop reason: HOSPADM

## 2024-08-28 RX ADMIN — METHYLPREDNISOLONE SODIUM SUCCINATE 125 MG: 125 INJECTION, POWDER, FOR SOLUTION INTRAMUSCULAR; INTRAVENOUS at 11:06

## 2024-08-28 RX ADMIN — IPRATROPIUM BROMIDE AND ALBUTEROL SULFATE 3 ML: .5; 2.5 SOLUTION RESPIRATORY (INHALATION) at 12:01

## 2024-08-28 RX ADMIN — IPRATROPIUM BROMIDE AND ALBUTEROL SULFATE 3 ML: .5; 2.5 SOLUTION RESPIRATORY (INHALATION) at 11:30

## 2024-08-28 RX ADMIN — IPRATROPIUM BROMIDE AND ALBUTEROL SULFATE 3 ML: .5; 2.5 SOLUTION RESPIRATORY (INHALATION) at 10:57

## 2024-08-28 NOTE — ED PROVIDER NOTES
Subjective   History of Present Illness  68-year-old female who presents to the ED today for shortness of breath.  She does have a history of COPD and wears 2 L of oxygen all the time.  She states she has had worsening shortness of breath for about a week.  She states initially she had quite a bit of cough but has been taking some Mucinex and it helped.  She denies any fever.  She denies any chest pain.  She states she still having wheezing and shortness of breath.  She states a couple weeks ago she did 2 different courses of antibiotics and some steroids and it did not help.    History provided by:  Patient  Shortness of Breath  Severity:  Moderate  Onset quality:  Gradual  Duration:  1 week  Timing:  Constant  Progression:  Worsening  Chronicity:  New  Relieved by:  Nothing  Worsened by:  Nothing  Associated symptoms: cough and wheezing    Associated symptoms: no abdominal pain, no chest pain, no diaphoresis, no fever, no hemoptysis, no rash, no sore throat, no sputum production, no syncope, no swollen glands and no vomiting        Review of Systems   Constitutional: Negative.  Negative for diaphoresis and fever.   HENT: Negative.  Negative for sore throat.    Eyes: Negative.    Respiratory:  Positive for cough, shortness of breath and wheezing. Negative for hemoptysis and sputum production.    Cardiovascular:  Negative for chest pain and syncope.   Gastrointestinal:  Negative for abdominal pain and vomiting.   Genitourinary: Negative.    Musculoskeletal: Negative.    Skin: Negative.  Negative for rash.   Neurological: Negative.    Psychiatric/Behavioral: Negative.     All other systems reviewed and are negative.      Past Medical History:   Diagnosis Date    COPD (chronic obstructive pulmonary disease)     Osteoporosis        Allergies   Allergen Reactions    Other        Past Surgical History:   Procedure Laterality Date    APPENDECTOMY       SECTION      SHOULDER SURGERY         Family History   Problem  Relation Age of Onset    Heart disease Father     COPD Father     Diabetes Father        Social History     Socioeconomic History    Marital status:    Tobacco Use    Smoking status: Every Day     Current packs/day: 0.00     Average packs/day: 0.3 packs/day for 40.0 years (10.0 ttl pk-yrs)     Types: Cigarettes     Start date: 1975     Last attempt to quit: 2015     Years since quittin.7    Smokeless tobacco: Never   Substance and Sexual Activity    Alcohol use: No    Drug use: No           Objective   Physical Exam  Vitals and nursing note reviewed.   Constitutional:       General: She is not in acute distress.     Appearance: She is well-developed. She is ill-appearing. She is not diaphoretic.   HENT:      Head: Normocephalic and atraumatic.      Mouth/Throat:      Mouth: Mucous membranes are moist.      Pharynx: Oropharynx is clear.   Eyes:      Extraocular Movements: Extraocular movements intact.      Pupils: Pupils are equal, round, and reactive to light.   Neck:      Vascular: No JVD.      Trachea: No tracheal deviation.   Cardiovascular:      Rate and Rhythm: Normal rate and regular rhythm.      Pulses: Normal pulses.      Heart sounds: Normal heart sounds.   Pulmonary:      Effort: Tachypnea and accessory muscle usage present.      Breath sounds: Examination of the right-middle field reveals decreased breath sounds. Examination of the left-middle field reveals decreased breath sounds. Examination of the right-lower field reveals decreased breath sounds. Examination of the left-lower field reveals decreased breath sounds. Decreased breath sounds and wheezing (in all lung fields) present.   Chest:      Chest wall: No tenderness.   Abdominal:      General: Bowel sounds are normal.      Palpations: Abdomen is soft.      Tenderness: There is no abdominal tenderness.   Musculoskeletal:         General: Normal range of motion.      Cervical back: Normal range of motion and neck supple.       Right lower leg: No edema.      Left lower leg: No edema.   Lymphadenopathy:      Cervical: No cervical adenopathy.   Skin:     General: Skin is warm and dry.      Capillary Refill: Capillary refill takes less than 2 seconds.   Neurological:      General: No focal deficit present.      Mental Status: She is alert and oriented to person, place, and time.   Psychiatric:         Mood and Affect: Mood normal.         Procedures       Results for orders placed or performed during the hospital encounter of 08/28/24   COVID-19 and FLU A/B PCR, 1 HR TAT - Swab, Nasopharynx    Specimen: Nasopharynx; Swab   Result Value Ref Range    COVID19 Not Detected Not Detected - Ref. Range    Influenza A PCR Not Detected Not Detected    Influenza B PCR Not Detected Not Detected   Comprehensive Metabolic Panel    Specimen: Arm, Left; Blood   Result Value Ref Range    Glucose 108 (H) 65 - 99 mg/dL    BUN 16 8 - 23 mg/dL    Creatinine 0.43 (L) 0.57 - 1.00 mg/dL    Sodium 138 136 - 145 mmol/L    Potassium 4.3 3.5 - 5.2 mmol/L    Chloride 98 98 - 107 mmol/L    CO2 34.1 (H) 22.0 - 29.0 mmol/L    Calcium 9.4 8.6 - 10.5 mg/dL    Total Protein 6.4 6.0 - 8.5 g/dL    Albumin 3.9 3.5 - 5.2 g/dL    ALT (SGPT) 9 1 - 33 U/L    AST (SGOT) 13 1 - 32 U/L    Alkaline Phosphatase 47 39 - 117 U/L    Total Bilirubin 0.3 0.0 - 1.2 mg/dL    Globulin 2.5 gm/dL    A/G Ratio 1.6 g/dL    BUN/Creatinine Ratio 37.2 (H) 7.0 - 25.0    Anion Gap 5.9 5.0 - 15.0 mmol/L    eGFR 106.1 >60.0 mL/min/1.73   BNP    Specimen: Arm, Left; Blood   Result Value Ref Range    proBNP 98.7 0.0 - 900.0 pg/mL   Single High Sensitivity Troponin T    Specimen: Arm, Left; Blood   Result Value Ref Range    HS Troponin T 8 <14 ng/L   CBC Auto Differential    Specimen: Arm, Left; Blood   Result Value Ref Range    WBC 6.65 3.40 - 10.80 10*3/mm3    RBC 4.06 3.77 - 5.28 10*6/mm3    Hemoglobin 12.2 12.0 - 15.9 g/dL    Hematocrit 40.2 34.0 - 46.6 %    MCV 99.0 (H) 79.0 - 97.0 fL    MCH 30.0 26.6 -  33.0 pg    MCHC 30.3 (L) 31.5 - 35.7 g/dL    RDW 12.4 12.3 - 15.4 %    RDW-SD 45.5 37.0 - 54.0 fl    MPV 9.3 6.0 - 12.0 fL    Platelets 266 140 - 450 10*3/mm3    Neutrophil % 62.9 42.7 - 76.0 %    Lymphocyte % 23.2 19.6 - 45.3 %    Monocyte % 10.2 5.0 - 12.0 %    Eosinophil % 2.6 0.3 - 6.2 %    Basophil % 0.9 0.0 - 1.5 %    Immature Grans % 0.2 0.0 - 0.5 %    Neutrophils, Absolute 4.19 1.70 - 7.00 10*3/mm3    Lymphocytes, Absolute 1.54 0.70 - 3.10 10*3/mm3    Monocytes, Absolute 0.68 0.10 - 0.90 10*3/mm3    Eosinophils, Absolute 0.17 0.00 - 0.40 10*3/mm3    Basophils, Absolute 0.06 0.00 - 0.20 10*3/mm3    Immature Grans, Absolute 0.01 0.00 - 0.05 10*3/mm3    nRBC 0.0 0.0 - 0.2 /100 WBC   Protime-INR    Specimen: Arm, Left; Blood   Result Value Ref Range    Protime 11.7 (L) 12.1 - 14.7 Seconds    INR 0.84 (L) 0.90 - 1.10   aPTT    Specimen: Arm, Left; Blood   Result Value Ref Range    PTT 29.5 26.5 - 34.5 seconds   Lactic Acid, Plasma    Specimen: Arm, Left; Blood   Result Value Ref Range    Lactate 1.3 0.5 - 2.0 mmol/L   C-reactive Protein    Specimen: Arm, Left; Blood   Result Value Ref Range    C-Reactive Protein <0.30 0.00 - 0.50 mg/dL   Magnesium    Specimen: Arm, Left; Blood   Result Value Ref Range    Magnesium 1.9 1.6 - 2.4 mg/dL   ECG 12 Lead ED Triage Standing Order; SOA   Result Value Ref Range    QT Interval 364 ms    QTC Interval 430 ms   Green Top (Gel)   Result Value Ref Range    Extra Tube Hold for add-ons.    Lavender Top   Result Value Ref Range    Extra Tube hold for add-on    Gold Top - SST   Result Value Ref Range    Extra Tube Hold for add-ons.    Light Blue Top   Result Value Ref Range    Extra Tube Hold for add-ons.           ED Course  ED Course as of 08/28/24 1834   Wed Aug 28, 2024   1055 XR Chest 1 View    FINDINGS:     LUNGS: COPD     HEART AND MEDIASTINUM: Heart and mediastinal contours are unremarkable        SKELETON: Bony and soft tissue structures are unremarkable.            IMPRESSION:  No radiographic evidence of acute cardiac or pulmonary disease.   []   1102 XR Chest 1 View  Normal sinus rhythm.  Rate 84.  Marked baseline artifact.  Normal axis.  Normal QT interval.  Q wave in lead V1 and V2.  No ST elevation or depression.  Abnormal EKG. [BC]      ED Course User Index  [] Crystal Cespedes, PA  [BC] Raul Haddad MD                                             Medical Decision Making  68-year-old female who presents to the ED for worsening shortness of breath.  She does have a history of COPD.  She was wheezing on exam.  She was given 3 DuoNeb treatments and IV Solu-Medrol.  Her symptoms improved and she was feeling much better.  Chest x-ray showed no acute abnormalities.  Her COVID and flu were negative.  She will be discharged home on prednisone.  She states she has breathing treatments at home that she can use.  She will be referred to the COPD clinic.  She was advised to return to the ED at any time if symptoms change or worsen.    Problems Addressed:  COPD with acute exacerbation: complicated acute illness or injury    Amount and/or Complexity of Data Reviewed  Labs: ordered.  Radiology: ordered. Decision-making details documented in ED Course.  ECG/medicine tests: ordered.    Risk  Prescription drug management.        Final diagnoses:   COPD with acute exacerbation       ED Disposition  ED Disposition       ED Disposition   Discharge    Condition   Stable    Comment   --               [facility]  [address] [place] [id number] [phone]        Bhupendra Lara APRN  [address] [address]  [phone]    Schedule an appointment as soon as possible for a visit in 2 days           Medication List        New Prescriptions      predniSONE 50 MG tablet  Commonly known as: DELTASONE  Take 1 tablet by mouth Daily.  Replaces: predniSONE 5 MG (21) tablet therapy pack dose pack            Stop      doxycycline 100 MG  capsule  Commonly known as: MONODOX     predniSONE 5 MG (21) tablet therapy pack dose pack  Replaced by: predniSONE 50 MG tablet               Where to Get Your Medications        These medications were sent to [facility] [address] [place] [address] [phone]  - [phone]   [address] [facility] [address]      Phone: [phone]   predniSONE 50 MG tablet            Crystal Cespedes PA  08/28/24 3667

## 2024-08-28 NOTE — DISCHARGE INSTRUCTIONS
You are being referred to the COPD clinic.  They will be calling you to set up an appointment.  Please also follow-up with your family doctor in the office at the next available appointment.  A prescription for prednisone has been sent to the pharmacy.  You can start this tomorrow.  Continue your home medications as prescribed.  Return to the ED at any time if symptoms change or worsen.

## 2024-09-02 LAB
BACTERIA SPEC AEROBE CULT: NORMAL
BACTERIA SPEC AEROBE CULT: NORMAL

## 2024-10-08 ENCOUNTER — HOSPITAL ENCOUNTER (OUTPATIENT)
Dept: PULMONOLOGY | Facility: HOSPITAL | Age: 69
Discharge: HOME | End: 2024-10-08
Payer: MEDICARE

## 2024-10-08 VITALS
OXYGEN SATURATION: 95 % | HEART RATE: 83 BPM | HEIGHT: 66 IN | BODY MASS INDEX: 18.64 KG/M2 | WEIGHT: 116 LBS | DIASTOLIC BLOOD PRESSURE: 77 MMHG | SYSTOLIC BLOOD PRESSURE: 116 MMHG

## 2024-10-08 DIAGNOSIS — J44.9 STAGE 4 VERY SEVERE COPD BY GOLD CLASSIFICATION: Primary | ICD-10-CM

## 2024-10-08 DIAGNOSIS — J44.9 STAGE 4 VERY SEVERE COPD BY GOLD CLASSIFICATION: ICD-10-CM

## 2024-10-08 DIAGNOSIS — J96.12 CHRONIC RESPIRATORY FAILURE WITH HYPOXIA AND HYPERCAPNIA: ICD-10-CM

## 2024-10-08 DIAGNOSIS — J96.11 CHRONIC RESPIRATORY FAILURE WITH HYPOXIA AND HYPERCAPNIA: ICD-10-CM

## 2024-10-08 DIAGNOSIS — F17.210 CIGARETTE NICOTINE DEPENDENCE WITHOUT COMPLICATION: ICD-10-CM

## 2024-10-08 DIAGNOSIS — R91.1 PULMONARY NODULE: ICD-10-CM

## 2024-10-08 PROCEDURE — 94010 BREATHING CAPACITY TEST: CPT

## 2024-10-08 PROCEDURE — G0463 HOSPITAL OUTPT CLINIC VISIT: HCPCS | Performed by: PHYSICIAN ASSISTANT

## 2024-10-08 RX ORDER — ROFLUMILAST 250 UG/1
250 TABLET ORAL DAILY
Qty: 28 TABLET | Refills: 0 | Status: SHIPPED | OUTPATIENT
Start: 2024-10-08

## 2024-10-08 RX ORDER — BUDESONIDE, GLYCOPYRROLATE, AND FORMOTEROL FUMARATE 160; 9; 4.8 UG/1; UG/1; UG/1
2 AEROSOL, METERED RESPIRATORY (INHALATION) 2 TIMES DAILY
Qty: 10.7 G | Refills: 11 | Status: SHIPPED | OUTPATIENT
Start: 2024-10-08

## 2024-10-08 NOTE — PROGRESS NOTES
[facility]  COPD Management       Federica Rahman is a 69 y.o. female seen in the [facility] for COPD.  The patient's current COPD regimen includes: Breo Ellipta, PRN albuterol HFA, and PRN albuterol nebs. Pt reports good adherence to maintenance regimen. Pt reports she is a smoker and smokes about 2 cigarettes/day. This has decreased from smoking 1 PPD. Patient was recently seen in ED on 24 for COPD exacerbation and was referred to clinic.    Federica Rahman reports that she is doing well with Breo. She states that she has been using it for about 7 years now. She feels like Breo is working well for her but says that it has a high copay ($100/month). Patient reports only having to use her albuterol HFA and nebs 1-2x/week.       Past Medical History:   Diagnosis Date    COPD (chronic obstructive pulmonary disease)     Osteoporosis      Social History     Socioeconomic History    Marital status:    Tobacco Use    Smoking status: Some Days     Current packs/day: 0.00     Average packs/day: 0.3 packs/day for 40.0 years (10.0 ttl pk-yrs)     Types: Cigarettes     Start date: 1975     Last attempt to quit: 2015     Years since quittin.8    Smokeless tobacco: Never   Vaping Use    Vaping status: Never Used   Substance and Sexual Activity    Alcohol use: No    Drug use: No     Other    Current Outpatient Medications:     albuterol (PROVENTIL) (2.5 MG/3ML) 0.083% nebulizer solution, USE ONE VIAL IN NEBULIZER EVERY 4 HOURS AS NEEDED FOR  SHORTNESS OF AIR, Disp: 30 vial, Rfl: 2    amLODIPine (NORVASC) 5 MG tablet, Take 1 tablet by mouth Daily., Disp: , Rfl:     Breo Ellipta 100-25 MCG/ACT aerosol powder , Inhale 1 puff Daily., Disp: , Rfl:     escitalopram (LEXAPRO) 10 MG tablet, Take 1 tablet by mouth Daily., Disp: , Rfl:     metoprolol tartrate (LOPRESSOR) 25 MG tablet, Take 1 tablet by mouth 2 (Two) Times a Day., Disp: 180 tablet, Rfl: 1    Multiple  Vitamins-Iron (QC DAILY MULTIVITAMINS/IRON) tablet, Take 1 tablet by mouth daily., Disp: , Rfl:     nitroglycerin (NITROSTAT) 0.4 MG SL tablet, Place 1 tablet under the tongue Every 5 (Five) Minutes As Needed for Chest Pain. Take no more than 3 doses in 15 minutes., Disp: 25 tablet, Rfl: 0    predniSONE (DELTASONE) 50 MG tablet, Take 1 tablet by mouth Daily., Disp: 5 tablet, Rfl: 0    rosuvastatin (Crestor) 20 MG tablet, Take 1 tablet by mouth Daily., Disp: 30 tablet, Rfl: 0    VENTOLIN  (90 BASE) MCG/ACT inhaler, Inhale 2 puffs Every 4 (Four) Hours As Needed for shortness of air., Disp: 1 inhaler, Rfl: 2    vitamin D (ERGOCALCIFEROL) 1.25 MG (82040 UT) capsule capsule, Take 1 capsule by mouth 1 (One) Time Per Week., Disp: , Rfl:         Vaccination Status   COVID 19: UTD  Influenza: Needs  Pneumococcal: Reports UTD  Zoster: Declined    Drug-Drug Interactions: No significant interactions    Drug-Disease Interactions (non-cardioselective beta blockers): N/A    Patient Assistance: Breo $100/month; [employer] application given to Denisha for Breztri    Inhaler Technique Observed? No  If yes, notes:     Treatment Goals: Risk Reduction and Symptom Control     Medication Assessment & Plan:     Patient will continue Breo Ellipta 2 puffs BID for now. Denisha submitting application for Breztri PAP through [employer].     Patient started on Daliresp 250 mcg daily. PA submitted and approved (copay $7.00/month for 250 mcg, $2.02/month for 500 mcg).    Advised Pt on the importance of continuing maintenance inhaler regimen and the importance of rinsing mouth after ICS use.     Counseled Pt on the importance of smoking cessation & offered referral to [facility] smoking cessation program. Patient declined at this time and plans to cut back on her own.      Recommended Shingrix. Patient declined at this time.    Thank you for allowing me to participate in the care of your patient,    Alicia Prince, PharmD  10/8/2024  13:06 EDT

## 2024-10-08 NOTE — PROGRESS NOTES
COPD CLINIC Questionnaire      APPOINTMENT DATE/TIME:____10/08/24___12:59 EDT___________  NAME:___Federica Rahman  :_1955_  DX_COPD       LENGTH OF DX:20 years  PULMONOLOGIST:_no  LAST OUTPATIENT PULMONARY VISIT:_ 8 years ago  DO YOU USE NIPPV:_No_  Device:__None  RECENT WEIGHT LOSS:   _No __  # OF PILLOWS DURING SLEEP:___2__  FLAT OR INCLINED BED:__Flat bed_  DO YOU HAVE DYSPNEA:__Yes_  DYSPNEA DURING:_Rest_  SMOKE HX:smoker  (1 ppd x 50 yrs)  #_OF HOSPITAL STAYS IN THE LAST YEAR DUE TO LUNG DISEASE:_0_  # OF ER VISITS IN THE LAST YEAR DUE TO SOB:__3          mMRC Dyspnea Scale        mMRC SCORE: 4         STOP BANG Screening Questionnaire       Snoring?   Do you snore loudly (loud enough to be heard through closed doors or that your bed partner elbows you for snoring at night)?     yes  Tired?   Do you often feel tired, fatigued, or sleepy during the daytime (such as falling asleep during driving or talking to someone)?     yes  Observed?   Has anyone observed you stop breathing or choking/gasping during your sleep?    yes  Pressure?   Do you have or are you being treated for high blood pressure?           no  Body mass index (BMI) more than 35 kg/m2?       no  Age older than 50?      yes  Neck size large (measured around Chaim's apple)? Is your shirt collar 16 inches (40 cm) or larger?   no  Gender (biologic sex): male?      no  STOP BANG Score:____4_____          STOP BANG Interpretation     Risk category Risk factors    Low risk             Yes to 0 to 2 of the questions    Intermediate risk Yes to 3 to 4 questions    High risk  Yes to 5 to 8 questions    High risk  Yes to 2 or more of 4 STOP questions and BMI >35 kg/m2    High risk  Yes to 2 or more of 4 STOP questions and neck circumference ?16 inches (?40 cm)    High risk  Yes to 2 or more of 4 STOP questions and male gender (biologic sex)               Grade: E    Comments:

## 2024-10-08 NOTE — PROGRESS NOTES
Subjective      Chief Complaint  Initial Evaluation    Subjective      History of Present Illness  Federica Rahman is a 69 y.o. female who presents today to [facility] with past medical history of Essential hypertension, paroxysmal atrial fibrillation, hyperlipidemia, osteoporosis, COPD, chronic hypoxic respiratory failure, and former tobacco abuse who presents today for Initial Evaluation. This visit is a initial evaluation  appointment.     Initial Evaluation:  Patient was recently evaluated in the ED on 8/28/2024 for shortness of breath.  She did receive DuoNeb treatments and IV Solu-Medrol during her ED stay.  Her workup was overall negative.  She was treated for a COPD exacerbation.  She was discharged home with doxycycline and prednisone 50 mg taper and referred to the COPD clinic for further evaluation.    Patient presents for her initial evaluation at the COPD clinic today.  Her friend is present with her during exam.  The patient reports that she is doing well.  She reports that her breathing is currently at baseline.  She does report chronic shortness of breath mostly with exertion and a chronic daily cough.  She is currently on Breo Ellipta but states that her co-pay cost is very high and has difficulty affording at times. She has an albuterol inhaler and nebulizer treatments which she does not have to use very frequently. She is trying to cut back on smoking and is down to 2 cigarettes/day which is down from 1 pack/day.  She is compliant with using her supplemental oxygen continuously with baseline of 2 L.      Current Outpatient Medications:     albuterol (PROVENTIL) (2.5 MG/3ML) 0.083% nebulizer solution, USE ONE VIAL IN NEBULIZER EVERY 4 HOURS AS NEEDED FOR  SHORTNESS OF AIR, Disp: 30 vial, Rfl: 2    amLODIPine (NORVASC) 5 MG tablet, Take 1 tablet by mouth Daily., Disp: , Rfl:     escitalopram (LEXAPRO) 10 MG tablet, Take 1 tablet by mouth Daily., Disp: , Rfl:      "metoprolol tartrate (LOPRESSOR) 25 MG tablet, Take 1 tablet by mouth 2 (Two) Times a Day., Disp: 180 tablet, Rfl: 1    Multiple Vitamins-Iron (QC DAILY MULTIVITAMINS/IRON) tablet, Take 1 tablet by mouth daily., Disp: , Rfl:     rosuvastatin (Crestor) 20 MG tablet, Take 1 tablet by mouth Daily., Disp: 30 tablet, Rfl: 0    VENTOLIN  (90 BASE) MCG/ACT inhaler, Inhale 2 puffs Every 4 (Four) Hours As Needed for shortness of air., Disp: 1 inhaler, Rfl: 2    vitamin D (ERGOCALCIFEROL) 1.25 MG (69049 UT) capsule capsule, Take 1 capsule by mouth 1 (One) Time Per Week., Disp: , Rfl:     Budeson-Glycopyrrol-Formoterol (Breztri Aerosphere) 160-9-4.8 MCG/ACT aerosol inhaler, Inhale 2 puffs 2 (Two) Times a Day., Disp: 10.7 g, Rfl: 11    nitroglycerin (NITROSTAT) 0.4 MG SL tablet, Place 1 tablet under the tongue Every 5 (Five) Minutes As Needed for Chest Pain. Take no more than 3 doses in 15 minutes. (Patient not taking: Reported on 10/8/2024), Disp: 25 tablet, Rfl: 0    roflumilast (Daliresp) 250 MCG tablet tablet, Take 1 tablet by mouth Daily., Disp: 28 tablet, Rfl: 0      Allergies   Allergen Reactions    Other        Objective     Objective   Vital Signs:  /77   Pulse 83   Ht 167.6 cm (66\")   Wt 52.6 kg (116 lb)   SpO2 95%   BMI 18.72 kg/m²   Estimated body mass index is 18.72 kg/m² as calculated from the following:    Height as of this encounter: 167.6 cm (66\").    Weight as of this encounter: 52.6 kg (116 lb).    BMI is within normal parameters. No other follow-up for BMI required.    Past Medical History:   Diagnosis Date    COPD (chronic obstructive pulmonary disease)     Osteoporosis      Past Surgical History:   Procedure Laterality Date    APPENDECTOMY       SECTION      SHOULDER SURGERY       Social History     Socioeconomic History    Marital status:    Tobacco Use    Smoking status: Every Day     Current packs/day: 1.00     Average packs/day: 1 pack/day for 50.0 years (50.0 ttl " "pk-yrs)     Types: Cigarettes    Smokeless tobacco: Never   Vaping Use    Vaping status: Never Used   Substance and Sexual Activity    Alcohol use: No    Drug use: No        Physical Exam  Constitutional:       General: She is awake.      Appearance: Normal appearance.      Interventions: Nasal cannula in place.   HENT:      Head: Normocephalic and atraumatic.      Nose: Nose normal.      Mouth/Throat:      Mouth: Mucous membranes are moist.      Pharynx: Oropharynx is clear.   Eyes:      Conjunctiva/sclera: Conjunctivae normal.      Pupils: Pupils are equal, round, and reactive to light.   Cardiovascular:      Rate and Rhythm: Normal rate and regular rhythm.      Pulses: Normal pulses.      Heart sounds: Normal heart sounds. No murmur heard.     No friction rub. No gallop.   Pulmonary:      Effort: Pulmonary effort is normal. No tachypnea, accessory muscle usage or respiratory distress.      Breath sounds: Decreased breath sounds present. No wheezing, rhonchi or rales.      Comments: Utilizing 2 L supplemental oxygen during visit.  Musculoskeletal:         General: Normal range of motion.      Cervical back: Full passive range of motion without pain and normal range of motion.   Skin:     General: Skin is warm and dry.   Neurological:      General: No focal deficit present.      Mental Status: She is alert. Mental status is at baseline.   Psychiatric:         Mood and Affect: Mood normal.         Behavior: Behavior normal. Behavior is cooperative.         Thought Content: Thought content normal.          Result Review :  The following labs and radiology results have been reviewed.    Lab Review:   No results found for: \"FEV1\", \"FVC\", \"UAP3HWW\", \"TLC\", \"DLCO\"  Admission on 08/28/2024, Discharged on 08/28/2024   Component Date Value Ref Range Status    QT Interval 08/28/2024 364  ms Final    QTC Interval 08/28/2024 430  ms Final    Glucose 08/28/2024 108 (H)  65 - 99 mg/dL Final    BUN 08/28/2024 16  8 - 23 mg/dL " Final    Creatinine 08/28/2024 0.43 (L)  0.57 - 1.00 mg/dL Final    Sodium 08/28/2024 138  136 - 145 mmol/L Final    Potassium 08/28/2024 4.3  3.5 - 5.2 mmol/L Final    Chloride 08/28/2024 98  98 - 107 mmol/L Final    CO2 08/28/2024 34.1 (H)  22.0 - 29.0 mmol/L Final    Calcium 08/28/2024 9.4  8.6 - 10.5 mg/dL Final    Total Protein 08/28/2024 6.4  6.0 - 8.5 g/dL Final    Albumin 08/28/2024 3.9  3.5 - 5.2 g/dL Final    ALT (SGPT) 08/28/2024 9  1 - 33 U/L Final    AST (SGOT) 08/28/2024 13  1 - 32 U/L Final    Alkaline Phosphatase 08/28/2024 47  39 - 117 U/L Final    Total Bilirubin 08/28/2024 0.3  0.0 - 1.2 mg/dL Final    Globulin 08/28/2024 2.5  gm/dL Final    A/G Ratio 08/28/2024 1.6  g/dL Final    BUN/Creatinine Ratio 08/28/2024 37.2 (H)  7.0 - 25.0 Final    Anion Gap 08/28/2024 5.9  5.0 - 15.0 mmol/L Final    eGFR 08/28/2024 106.1  >60.0 mL/min/1.73 Final    proBNP 08/28/2024 98.7  0.0 - 900.0 pg/mL Final    HS Troponin T 08/28/2024 8  <14 ng/L Final    Extra Tube 08/28/2024 Hold for add-ons.   Final    Auto resulted.    Extra Tube 08/28/2024 hold for add-on   Final    Auto resulted    Extra Tube 08/28/2024 Hold for add-ons.   Final    Auto resulted.    Extra Tube 08/28/2024 Hold for add-ons.   Final    Auto resulted    WBC 08/28/2024 6.65  3.40 - 10.80 10*3/mm3 Final    RBC 08/28/2024 4.06  3.77 - 5.28 10*6/mm3 Final    Hemoglobin 08/28/2024 12.2  12.0 - 15.9 g/dL Final    Hematocrit 08/28/2024 40.2  34.0 - 46.6 % Final    MCV 08/28/2024 99.0 (H)  79.0 - 97.0 fL Final    MCH 08/28/2024 30.0  26.6 - 33.0 pg Final    MCHC 08/28/2024 30.3 (L)  31.5 - 35.7 g/dL Final    RDW 08/28/2024 12.4  12.3 - 15.4 % Final    RDW-SD 08/28/2024 45.5  37.0 - 54.0 fl Final    MPV 08/28/2024 9.3  6.0 - 12.0 fL Final    Platelets 08/28/2024 266  140 - 450 10*3/mm3 Final    Neutrophil % 08/28/2024 62.9  42.7 - 76.0 % Final    Lymphocyte % 08/28/2024 23.2  19.6 - 45.3 % Final    Monocyte % 08/28/2024 10.2  5.0 - 12.0 % Final     Eosinophil % 08/28/2024 2.6  0.3 - 6.2 % Final    Basophil % 08/28/2024 0.9  0.0 - 1.5 % Final    Immature Grans % 08/28/2024 0.2  0.0 - 0.5 % Final    Neutrophils, Absolute 08/28/2024 4.19  1.70 - 7.00 10*3/mm3 Final    Lymphocytes, Absolute 08/28/2024 1.54  0.70 - 3.10 10*3/mm3 Final    Monocytes, Absolute 08/28/2024 0.68  0.10 - 0.90 10*3/mm3 Final    Eosinophils, Absolute 08/28/2024 0.17  0.00 - 0.40 10*3/mm3 Final    Basophils, Absolute 08/28/2024 0.06  0.00 - 0.20 10*3/mm3 Final    Immature Grans, Absolute 08/28/2024 0.01  0.00 - 0.05 10*3/mm3 Final    nRBC 08/28/2024 0.0  0.0 - 0.2 /100 WBC Final    COVID19 08/28/2024 Not Detected  Not Detected - Ref. Range Final    Influenza A PCR 08/28/2024 Not Detected  Not Detected Final    Influenza B PCR 08/28/2024 Not Detected  Not Detected Final    Protime 08/28/2024 11.7 (L)  12.1 - 14.7 Seconds Final    INR 08/28/2024 0.84 (L)  0.90 - 1.10 Final    PTT 08/28/2024 29.5  26.5 - 34.5 seconds Final    Blood Culture 08/28/2024 No growth at 5 days   Final    Blood Culture 08/28/2024 No growth at 5 days   Final    Lactate 08/28/2024 1.3  0.5 - 2.0 mmol/L Final    C-Reactive Protein 08/28/2024 <0.30  0.00 - 0.50 mg/dL Final    Magnesium 08/28/2024 1.9  1.6 - 2.4 mg/dL Final        Imaging Results (Most Recent)       None            Radiology Review:   Imaging Results (Last 72 Hours)       ** No results found for the last 72 hours. **          Reviewed CT chest without contrast from 10/04/2023  Narrative & Impression   EXAM: CT CHEST WO CONTRAST DIAGNOSTIC-      CLINICAL INDICATION:shortness of breath/hypoxemia; J44.1-Chronic  obstructive pulmonary disease with (acute) exacerbation      COMPARISON: 4/24/2016     TECHNIQUE: Multiple axial CT images were obtained from lung apex through  upper abdomen without the administration of IV contrast. Reformatted  images in the coronal and/or sagittal plane(s) were generated from the  axial data set to facilitate diagnostic accuracy  and/or surgical  planning.     Radiation dose reduction techniques were utilized per ALARA protocol.  Automated exposure control was initiated through either or CareDose or  DoseRight software packages by  protocol.    DOSE (DLP mGy-cm):        FINDINGS:     LUNGS: Parenchymal nodule in the left apex measuring 6.3 mm image 22 of  axial series 2.  COPD  Cavitary collection pleural-based posteromedial in the right lung image  43 of axial series 2 is very similar to the previous exam.     HEART: Unremarkable.     MEDIASTINUM: No masses. No enlarged lymph nodes.  No fluid collections.     PLEURA: No pleural effusion. No pleural mass or abnormal calcification.  No pneumothorax.     VASCULATURE: No evidence of aneurysm.     BONES: No acute bony abnormality.     VISUALIZED UPPER ABDOMEN:The upper abdomen is unremarkable as  visualized.     Other: None.     IMPRESSION:     1. Interval development of parenchymal nodule in the left apex.  2. Extensive COPD with multiple blebs.                 This report was finalized on 10/4/2023 1:36 PM by Dr. Edmund Thayer MD.         Assessment / Plan         Assessment   Diagnoses and all orders for this visit:    1. Stage 4 very severe COPD by GOLD classification (Primary)  Spirometry performed during visit which revealed very severe obstructive lung disease (FEV1/FVC 34%, FEV1 16%, and FVC 37%).  Findings consistent with stage IV very severe COPD.  Currently on Breo Ellipta and has been for in a significant amount of time but has difficulty affording co-pay cost (~$100/month)  Completed [place] application during visit and pharmacy to assist enrolling patient for financial assistance for Breztri.  Discussed with patient starting on Daliresp due to severity of lung function and she is agreeable.  Will start on Daliresp 250 mcg for 1 month and increase to 500 mcg thereafter.  Continue albuterol inhaler and DuoNeb treatments every 4-6 hours as needed.  Screened for alpha-1  antitrypsin deficiency during visit.    -     Spirometry; Future  -     Budeson-Glycopyrrol-Formoterol (Breztri Aerosphere) 160-9-4.8 MCG/ACT aerosol inhaler; Inhale 2 puffs 2 (Two) Times a Day.  Dispense: 10.7 g; Refill: 11  -     roflumilast (Daliresp) 250 MCG tablet tablet; Take 1 tablet by mouth Daily.  Dispense: 28 tablet; Refill: 0    2. Cigarette nicotine dependence without complication  3. Pulmonary nodule  Patient is currently trying to cut back on smoking as she smokes 1-2 cigarettes per day and was previously smoking 1-2 packs per day.   Previous CT imaging from 10/04/2023 reviewed and notable for pulmonary nodule of left apex measuring 6 mm (image 22) and pleural based cavitary collection of right lung which appears stable from 2015 (image 43).   Will discuss ordering annual low-dose CT lung cancer screening at upcoming office visit.     4. Chronic respiratory failure with hypoxia and hypercapnia  Patient is compliant with utilizing supplemental oxygen continuously with baseline of 2 L.   Patient would benefit from non-invasive ventilator due to stage 4 COPD and chronic hypoxic/hypercapnic respiratory failure (previous pCO2 levels on ABG from 10/2023 was 68.2).NIV is required to provide a target tidal volume with the ability to change pressures quickly in order to maintain the patient's target tidal volume.  This would allow for appropriate removal of carbon dioxide.  This cannot be provided by less costly options including BiPAP ASV or ST.  NIV with AVAPS/AE would also provide an auto backup rate, which may help to prevent air trapping, breath stacking which can further negatively impact the patient's already impaired lung function.  Due to the patient's worsening condition and individual case, noninvasive ventilator therapy is recommended to provide the most benefit.  This may also further reduce exacerbations of acute on chronic hypoxic and hypercarbic respiratory failure, which can result in   outpatient visits, ER visits, and hospitalizations. Will send order to [facility].     Addendum (10/09/2024): [facility] updated ABG for NIV approval. Can collect at next appointment scheduled for November 12th.      Medications Discontinued During This Encounter   Medication Reason    predniSONE (DELTASONE) 50 MG tablet *Therapy completed    Breo Ellipta 100-25 MCG/ACT aerosol powder  *Therapy completed      New Medications Ordered This Visit   Medications    Budeson-Glycopyrrol-Formoterol (Breztri Aerosphere) 160-9-4.8 MCG/ACT aerosol inhaler     Sig: Inhale 2 puffs 2 (Two) Times a Day.     Dispense:  10.7 g     Refill:  11    roflumilast (Daliresp) 250 MCG tablet tablet     Sig: Take 1 tablet by mouth Daily.     Dispense:  28 tablet     Refill:  0     I spent >60 minutes caring for Federica on this date of service. This time includes time spent by me in the following activities:preparing for the visit, reviewing tests, obtaining and/or reviewing a separately obtained history, performing a medically appropriate examination and/or evaluation , counseling and educating the patient/family/caregiver, ordering medications, tests, or procedures, referring and communicating with other health care professionals , documenting information in the medical record, independently interpreting results and communicating that information with the patient/family/caregiver, and care coordination    Follow Up   Return in about 4 weeks (around 11/5/2024), or if symptoms worsen or fail to improve, for Next scheduled follow up.    Patient was given instructions and counseling regarding her condition or for health maintenance advice. Please see specific information pulled into the AVS if appropriate.       This document has been electronically signed by Janet Regan PA-C   October 8, 2024 17:07 EDT    Dictated Utilizing Dragon Dictation: Part of this note may be an electronic transcription/translation  of spoken language to printed text using the Dragon Dictation System.

## 2024-10-08 NOTE — PROGRESS NOTES
"COPD Education    NAME:___Federica Rahman  :_1955_  APPOINTMENT DATE/TIME:___________10/08/24___12:53 EDT___________    COPD Education Evaluation            COPD Education Completed (See Note) Yes     COPD Clinic encounter: 1st    Referring/consulting Provider: Crystal Cespedes PA-C     Reason for Consultation:  COPD Exacerbation   COPD Diagnosis Length 20 years     Current Outpatient Pulmonologist     NO and Dr. Bond, 2016           Subjective            Spirometry      Spirometry Completed YES and 10/08/2024     FVC 37%     Fev1 16%     Fev1/FVC 34     GOLD Stage 4           Classification of Airflow Limitation Severity in COPD (Based on Post-Bronchodilator FEV1)           Gold 1: Mild FEV1 ? 80% predicted      Gold 2:  Moderate 50% ? FEV1 < 80% predicted   Gold 3: Severe 30% ? FEV1 < 50% predicted   Gold 4: Very Severe FEV1 < 30% predicted            Dyspnea:        Do you have Dyspnea? Yes and With Exertion.       DME Equipment Used:              Home O2? YES     Home O2 device? Home oxygen concentrator  POC     Nebulizer YES     NIPPV None      Objective:          Barriers to Learning? No    Discussed:             COPD education given via the booklet \"A Patient's Guide to COPD\".     COPD Zones: (Green/yellow/Red): YES     Exacerbation or Flare up signs and symptoms: YES       COPD Medication Non-Compliance YES       Managing Medications: YES     Patient understands use of Rescue Medications: YES     Type of Rescue inhaler patient currently has: Albuterol and MDI and nebulizer solution   Patient understands use of Maintenance Medications: YES     Type of Maintenance inhaler patient currently has: ICS/LABA and Breo    Proper MDI technique (w/wo Spacer): YES     Provided patient a Spacer: YES and Patient understood proper technique very well, with and without SPACER       Breathing Techniques:            Purse-lipped breathing YES and Patient could teach me how to Purse-Lip breath          Diaphragmatic " breathing NO     Oxygen therapy SAFETY:  YES           Action Plan            Aerobika for sputum mobilization: NO     Rescue Inhaler ordered: NO     COPD Maintenance Inhaler ordered: YES and Breztri MDI and Daliresp     COPD/Lung Health Clinic follow up scheduled: YES and 11/12/2024     Education Minutes with patient: YES and 30 minutes       Goals Discussed with patient: Become smoke free., Take medications as ordered., GO to Dr. appointments., Increase activity., Eat healthier., Increase use of pursed lip breathing., Decrease flare-ups., and Increase COPD Knowledge.    Comments: Ms. Rahman presents to the COPD Clinic for a initial evaluation. She was referred by Crystal Cespedes during recent ED visit on 8/28/2024 for COPD Exacerbation. Ms. Rahman has Breo Elipta for COPD maintenance, she uses Breo every day. She also has Albuterol MDI and nebulizer solution for rescue. Ms. Rahman uses oxygen at 2L [place] continuously.     Today, she completed:    -Spirometry: results shown above  -Alpha 1 Genotype sample collected and placed in the mail  -Began discussing COPD educational topics listed above.    Her copay for Breo is costly. ISAMAR Gonzales, to begin her [place] Breztri through [place] and Daliresp 250.    ISAMAR Gonzales to order a Trilogy Machine for night time use.    When she returns for her next visit, she will:    -Attempt a 6MWT  -Continue discussing COPD Education  -Evaluate her adherence to Daliresp and Breztri  -Evaluate Adherence to Trilogy    Thank you for allowing me to participate in her care,    DIPIKA Becerra, RRT  COPD Navigator  10/08/24  12:53 EDT

## 2024-10-09 NOTE — ADDENDUM NOTE
Encounter addended by: Janet Regan PA-C on: 10/9/2024 11:19 AM   Actions taken: Clinical Note Signed

## 2024-12-03 ENCOUNTER — OFFICE VISIT (OUTPATIENT)
Dept: CARDIOLOGY | Facility: CLINIC | Age: 69
End: 2024-12-03
Payer: MEDICARE

## 2024-12-03 VITALS
HEIGHT: 66 IN | SYSTOLIC BLOOD PRESSURE: 110 MMHG | DIASTOLIC BLOOD PRESSURE: 70 MMHG | WEIGHT: 114.8 LBS | HEART RATE: 81 BPM | BODY MASS INDEX: 18.45 KG/M2 | OXYGEN SATURATION: 94 %

## 2024-12-03 DIAGNOSIS — F17.210 CIGARETTE SMOKER: ICD-10-CM

## 2024-12-03 DIAGNOSIS — I10 ESSENTIAL HYPERTENSION: ICD-10-CM

## 2024-12-03 DIAGNOSIS — E78.2 MIXED HYPERLIPIDEMIA: ICD-10-CM

## 2024-12-03 DIAGNOSIS — I48.0 PAROXYSMAL ATRIAL FIBRILLATION: Primary | ICD-10-CM

## 2024-12-03 PROCEDURE — 99214 OFFICE O/P EST MOD 30 MIN: CPT | Performed by: INTERNAL MEDICINE

## 2024-12-03 PROCEDURE — 93000 ELECTROCARDIOGRAM COMPLETE: CPT | Performed by: INTERNAL MEDICINE

## 2024-12-03 PROCEDURE — 3078F DIAST BP <80 MM HG: CPT | Performed by: INTERNAL MEDICINE

## 2024-12-03 PROCEDURE — 3074F SYST BP LT 130 MM HG: CPT | Performed by: INTERNAL MEDICINE

## 2024-12-03 NOTE — LETTER
December 3, 2024     ANGELITO Reyes  [address]  [place] [address]    Patient: Federica Rahman   YOB: 1955   Date of Visit: 12/3/2024       Dear ANGELITO Reyes    Federica Rahman was in my office today. Below is a copy of my note.    If you have questions, please do not hesitate to call me. I look forward to following Federica along with you.         Sincerely,        Janel Aldana MD        CC: No Recipients    subjective     Chief Complaint   Patient presents with   • Atrial Fibrillation       Problems Addressed This Visit  1. Cigarette smoker    2. Paroxysmal atrial fibrillation    3. Mixed hyperlipidemia    4. Essential hypertension        History of Present Illness    Federica is a 69 years old white female who is here for cardiology follow-up.  She has history of paroxysmal atrial fibrillation which was felt to be secondary to pericarditis.  At that time she was in [place] quite extensive workup was done which was negative.  She remained in sinus rhythm and was never started on anticoagulation.  [id number] score is 2.  She denies any palpitations.  No chest pain but complains of shortness of breath and has history of COPD with respiratory failure on home O2 therapy.    Holter monitor had shown 5 beat run of nonsustained V. tach patient was asymptomatic and is taking Lopressor 25 twice daily.  Heart rate is around 80 bpm.    Her blood pressure is around 110/70.  She is also taking amlodipine 5 mg daily.  She was advised to check her blood pressure closely at home and she probably does not need amlodipine she will try to decrease amlodipine depending her blood pressure.    Patient has COPD chronic respiratory failure on home O2 therapy unfortunately she still continues to smoke although she is trying to cut it down.      Past Surgical History:   Procedure Laterality Date   • APPENDECTOMY     •  SECTION     • SHOULDER SURGERY       Family History    Problem Relation Age of Onset   • Heart disease Father    • COPD Father    • Diabetes Father      Past Medical History:   Diagnosis Date   • COPD (chronic obstructive pulmonary disease)    • Osteoporosis      Patient Active Problem List   Diagnosis   • Chronic obstructive pulmonary disease   • Hypoxia   • Sleep apnea   • Shortness of breath   • Pneumonia due to Mycoplasma pneumoniae   • Allergic rhinitis   • Hypersomnia   • Cough   • Osteoporosis   • Essential hypertension   • Paroxysmal atrial fibrillation   • Idiopathic pericarditis, resolved   • Chest pain, atypical, resolved   • Cigarette smoker   • Nonsustained ventricular tachycardia   • Mixed hyperlipidemia       Social History     Tobacco Use   • Smoking status: Every Day     Current packs/day: 1.00     Average packs/day: 1 pack/day for 50.0 years (50.0 ttl pk-yrs)     Types: Cigarettes   • Smokeless tobacco: Never   Vaping Use   • Vaping status: Never Used   Substance Use Topics   • Alcohol use: No   • Drug use: No       Allergies   Allergen Reactions   • Other        Current Outpatient Medications on File Prior to Visit   Medication Sig   • albuterol (PROVENTIL) (2.5 MG/3ML) 0.083% nebulizer solution USE ONE VIAL IN NEBULIZER EVERY 4 HOURS AS NEEDED FOR  SHORTNESS OF AIR   • amLODIPine (NORVASC) 5 MG tablet Take 1 tablet by mouth Daily.   • Budeson-Glycopyrrol-Formoterol (Breztri Aerosphere) 160-9-4.8 MCG/ACT aerosol inhaler Inhale 2 puffs 2 (Two) Times a Day.   • escitalopram (LEXAPRO) 10 MG tablet Take 1 tablet by mouth Daily.   • metoprolol tartrate (LOPRESSOR) 25 MG tablet Take 1 tablet by mouth 2 (Two) Times a Day.   • Multiple Vitamins-Iron (QC DAILY MULTIVITAMINS/IRON) tablet Take 1 tablet by mouth daily.   • nitroglycerin (NITROSTAT) 0.4 MG SL tablet Place 1 tablet under the tongue Every 5 (Five) Minutes As Needed for Chest Pain. Take no more than 3 doses in 15 minutes.   • roflumilast (Daliresp) 250 MCG tablet tablet Take 1 tablet by mouth  "Daily.   • rosuvastatin (Crestor) 20 MG tablet Take 1 tablet by mouth Daily.   • VENTOLIN  (90 BASE) MCG/ACT inhaler Inhale 2 puffs Every 4 (Four) Hours As Needed for shortness of air.   • vitamin D (ERGOCALCIFEROL) 1.25 MG (10845 UT) capsule capsule Take 1 capsule by mouth 1 (One) Time Per Week.     No current facility-administered medications on file prior to visit.     (Not in a hospital admission)        Results for orders placed during the hospital encounter of 06/03/24    Stress test with myocardial perfusion one day    Interpretation Summary  •  Myocardial perfusion imaging indicates a normal myocardial perfusion study with no evidence of ischemia.  •  Left ventricular ejection fraction is normal.  •  TID 1.14.  •  Findings consistent with a normal ECG stress test.          The following portions of the patient's history were reviewed and updated as appropriate: allergies, current medications, past family history, past medical history, past social history, past surgical history and problem list.    Review of Systems   Constitutional: Negative.   HENT: Negative.  Negative for congestion.    Eyes: Negative.    Cardiovascular: Negative.  Negative for chest pain, cyanosis, dyspnea on exertion, irregular heartbeat, leg swelling, near-syncope, orthopnea, palpitations, paroxysmal nocturnal dyspnea and syncope.   Respiratory: Negative.  Negative for shortness of breath.    Hematologic/Lymphatic: Negative.    Musculoskeletal: Negative.    Gastrointestinal: Negative.    Neurological: Negative.  Negative for headaches.          Objective:     /70   Pulse 81   Ht 167.6 cm (66\")   Wt 52.1 kg (114 lb 12.8 oz)   SpO2 94% Comment: 2 liters  BMI 18.53 kg/m²   Pulmonary:      Effort: Pulmonary effort is normal.      Breath sounds: Normal breath sounds. No stridor. No wheezing. No rhonchi. No rales.   Cardiovascular:      PMI at left midclavicular line. Normal rate. Regular rhythm. Normal S1. Normal S2.       " Murmurs: There is no murmur.      No gallop.  No click. No rub.   Pulses:     Intact distal pulses.   Edema:     Peripheral edema absent.           Lab Review  Lab Results   Component Value Date     08/28/2024    K 4.3 08/28/2024    CL 98 08/28/2024    BUN 16 08/28/2024    CREATININE 0.43 (L) 08/28/2024    GLUCOSE 108 (H) 08/28/2024    CALCIUM 9.4 08/28/2024    ALT 9 08/28/2024    ALKPHOS 47 08/28/2024    LABIL2 1.7 04/29/2016     Lab Results   Component Value Date    CKTOTAL 92 04/24/2016     Lab Results   Component Value Date    WBC 6.65 08/28/2024    HGB 12.2 08/28/2024    HCT 40.2 08/28/2024     08/28/2024     Lab Results   Component Value Date    INR 0.84 (L) 08/28/2024     Lab Results   Component Value Date    MG 1.9 08/28/2024     Lab Results   Component Value Date    TSH 1.500 08/17/2023     Lab Results   Component Value Date    BNP 19 04/21/2016     Lab Results   Component Value Date    CHOL 204 (H) 08/17/2023    TRIG 81 08/17/2023    HDL 91 (H) 08/17/2023    VLDL 14 08/17/2023    LDL 99 08/17/2023     Lab Results   Component Value Date    LDL 99 08/17/2023     Lab Results   Component Value Date    PROBNP 98.7 08/28/2024               Procedures       I personally viewed and interpreted the patient's LAB data         Assessment:     1. Cigarette smoker    2. Paroxysmal atrial fibrillation    3. Mixed hyperlipidemia    4. Essential hypertension          Plan:              No follow-ups on file.

## 2024-12-03 NOTE — PROGRESS NOTES
subjective     Chief Complaint   Patient presents with    Atrial Fibrillation       Problems Addressed This Visit  1. Paroxysmal atrial fibrillation    2. Cigarette smoker    3. Mixed hyperlipidemia    4. Essential hypertension        History of Present Illness    Federica is a 69 years old white female who is here for cardiology follow-up.  She has history of paroxysmal atrial fibrillation which was felt to be secondary to pericarditis.  At that time she was in [place] quite extensive workup was done which was negative.  She remained in sinus rhythm and was never started on anticoagulation.  [id number] score is 2.  She denies any palpitations.  No chest pain but complains of shortness of breath and has history of COPD with respiratory failure on home O2 therapy.    Holter monitor had shown 5 beat run of nonsustained V. tach patient was asymptomatic and is taking Lopressor 25 twice daily.  Heart rate is around 80 bpm.    Her blood pressure is around 110/70.  She is also taking amlodipine 5 mg daily.  She was advised to check her blood pressure closely at home and she probably does not need amlodipine she will try to decrease amlodipine depending her blood pressure.    Patient has COPD chronic respiratory failure on home O2 therapy unfortunately she still continues to smoke although she is trying to cut it down.      Past Surgical History:   Procedure Laterality Date    APPENDECTOMY       SECTION      SHOULDER SURGERY       Family History   Problem Relation Age of Onset    Heart disease Father     COPD Father     Diabetes Father      Past Medical History:   Diagnosis Date    COPD (chronic obstructive pulmonary disease)     Osteoporosis      Patient Active Problem List   Diagnosis    Chronic obstructive pulmonary disease    Hypoxia    Sleep apnea    Shortness of breath    Pneumonia due to Mycoplasma pneumoniae    Allergic rhinitis    Hypersomnia    Cough    Osteoporosis    Essential hypertension     Paroxysmal atrial fibrillation    Idiopathic pericarditis, resolved    Chest pain, atypical, resolved    Cigarette smoker    Nonsustained ventricular tachycardia    Mixed hyperlipidemia       Social History     Tobacco Use    Smoking status: Every Day     Current packs/day: 1.00     Average packs/day: 1 pack/day for 50.0 years (50.0 ttl pk-yrs)     Types: Cigarettes    Smokeless tobacco: Never   Vaping Use    Vaping status: Never Used   Substance Use Topics    Alcohol use: No    Drug use: No       Allergies   Allergen Reactions    Other        Current Outpatient Medications on File Prior to Visit   Medication Sig    albuterol (PROVENTIL) (2.5 MG/3ML) 0.083% nebulizer solution USE ONE VIAL IN NEBULIZER EVERY 4 HOURS AS NEEDED FOR  SHORTNESS OF AIR    amLODIPine (NORVASC) 5 MG tablet Take 1 tablet by mouth Daily.    Budeson-Glycopyrrol-Formoterol (Breztri Aerosphere) 160-9-4.8 MCG/ACT aerosol inhaler Inhale 2 puffs 2 (Two) Times a Day.    escitalopram (LEXAPRO) 10 MG tablet Take 1 tablet by mouth Daily.    metoprolol tartrate (LOPRESSOR) 25 MG tablet Take 1 tablet by mouth 2 (Two) Times a Day.    Multiple Vitamins-Iron (QC DAILY MULTIVITAMINS/IRON) tablet Take 1 tablet by mouth daily.    nitroglycerin (NITROSTAT) 0.4 MG SL tablet Place 1 tablet under the tongue Every 5 (Five) Minutes As Needed for Chest Pain. Take no more than 3 doses in 15 minutes.    roflumilast (Daliresp) 250 MCG tablet tablet Take 1 tablet by mouth Daily.    rosuvastatin (Crestor) 20 MG tablet Take 1 tablet by mouth Daily.    VENTOLIN  (90 BASE) MCG/ACT inhaler Inhale 2 puffs Every 4 (Four) Hours As Needed for shortness of air.    vitamin D (ERGOCALCIFEROL) 1.25 MG (79967 UT) capsule capsule Take 1 capsule by mouth 1 (One) Time Per Week.     No current facility-administered medications on file prior to visit.     (Not in a hospital admission)        Results for orders placed during the hospital encounter of 06/03/24    Stress test with  "myocardial perfusion one day    Interpretation Summary    Myocardial perfusion imaging indicates a normal myocardial perfusion study with no evidence of ischemia.    Left ventricular ejection fraction is normal.    TID 1.14.    Findings consistent with a normal ECG stress test.          The following portions of the patient's history were reviewed and updated as appropriate: allergies, current medications, past family history, past medical history, past social history, past surgical history and problem list.    Review of Systems   Constitutional: Negative.   HENT: Negative.  Negative for congestion.    Eyes: Negative.    Cardiovascular: Negative.  Negative for chest pain, cyanosis, dyspnea on exertion, irregular heartbeat, leg swelling, near-syncope, orthopnea, palpitations, paroxysmal nocturnal dyspnea and syncope.   Respiratory: Negative.  Negative for shortness of breath.    Hematologic/Lymphatic: Negative.    Musculoskeletal: Negative.    Gastrointestinal: Negative.    Neurological: Negative.  Negative for headaches.          Objective:     /70   Pulse 81   Ht 167.6 cm (66\")   Wt 52.1 kg (114 lb 12.8 oz)   SpO2 94% Comment: 2 liters  BMI 18.53 kg/m²   Pulmonary:      Effort: Pulmonary effort is normal.      Breath sounds: Normal breath sounds. No stridor. No wheezing. No rhonchi. No rales.   Cardiovascular:      PMI at left midclavicular line. Normal rate. Regular rhythm. Normal S1. Normal S2.       Murmurs: There is no murmur.      No gallop.  No click. No rub.   Pulses:     Intact distal pulses.   Edema:     Peripheral edema absent.           Lab Review  Lab Results   Component Value Date     08/28/2024    K 4.3 08/28/2024    CL 98 08/28/2024    BUN 16 08/28/2024    CREATININE 0.43 (L) 08/28/2024    GLUCOSE 108 (H) 08/28/2024    CALCIUM 9.4 08/28/2024    ALT 9 08/28/2024    ALKPHOS 47 08/28/2024    LABIL2 1.7 04/29/2016     Lab Results   Component Value Date    CKTOTAL 92 04/24/2016     Lab " Results   Component Value Date    WBC 6.65 08/28/2024    HGB 12.2 08/28/2024    HCT 40.2 08/28/2024     08/28/2024     Lab Results   Component Value Date    INR 0.84 (L) 08/28/2024     Lab Results   Component Value Date    MG 1.9 08/28/2024     Lab Results   Component Value Date    TSH 1.500 08/17/2023     Lab Results   Component Value Date    BNP 19 04/21/2016     Lab Results   Component Value Date    CHOL 204 (H) 08/17/2023    TRIG 81 08/17/2023    HDL 91 (H) 08/17/2023    VLDL 14 08/17/2023    LDL 99 08/17/2023     Lab Results   Component Value Date    LDL 99 08/17/2023     Lab Results   Component Value Date    PROBNP 98.7 08/28/2024                 ECG 12 Lead    Date/Time: 12/3/2024 3:52 PM  Performed by: Janel Aldana MD    Authorized by: Janel Aldana MD  Comparison: compared with previous ECG from 8/28/2024  Similar to previous ECG  Rhythm: sinus rhythm  Rate: normal  BPM: 81  Conduction: conduction normal  ST Segments: ST segments normal  T Waves: T waves normal  QRS axis: normal  Other: no other findings    Clinical impression: normal ECG             I personally viewed and interpreted the patient's LAB data         Assessment:     1. Paroxysmal atrial fibrillation    2. Cigarette smoker    3. Mixed hyperlipidemia    4. Essential hypertension          Plan:     Paroxysmal atrial fibrillation  Patient has no further episodes of atrial fibrillation.  Atrial fibrillation was first discovered in [place] however she was not started on any anticoagulation.  She is doing very well and is asymptomatic.    Blood pressure is running low around 110 systolic.  He was advised to decrease Norvasc.  She will continue metoprolol tartrate 25 twice daily heart rate is around 80 bpm.    Hyperlipidemia  She is taking Crestor 20 mg daily.  No drug side effects.    Follow-up in 6 months.        No follow-ups on file.

## 2024-12-10 ENCOUNTER — HOSPITAL ENCOUNTER (OUTPATIENT)
Dept: PULMONOLOGY | Facility: HOSPITAL | Age: 69
Discharge: HOME | End: 2024-12-10
Admitting: PHYSICIAN ASSISTANT
Payer: MEDICARE

## 2024-12-10 VITALS
DIASTOLIC BLOOD PRESSURE: 70 MMHG | HEART RATE: 81 BPM | WEIGHT: 114.2 LBS | OXYGEN SATURATION: 94 % | BODY MASS INDEX: 18.43 KG/M2 | SYSTOLIC BLOOD PRESSURE: 116 MMHG

## 2024-12-10 DIAGNOSIS — J96.12 CHRONIC RESPIRATORY FAILURE WITH HYPOXIA AND HYPERCAPNIA: ICD-10-CM

## 2024-12-10 DIAGNOSIS — J98.4 CAVITARY LESION OF LUNG: ICD-10-CM

## 2024-12-10 DIAGNOSIS — F17.210 CIGARETTE NICOTINE DEPENDENCE WITHOUT COMPLICATION: ICD-10-CM

## 2024-12-10 DIAGNOSIS — J44.9 STAGE 4 VERY SEVERE COPD BY GOLD CLASSIFICATION: Primary | ICD-10-CM

## 2024-12-10 DIAGNOSIS — J96.11 CHRONIC RESPIRATORY FAILURE WITH HYPOXIA AND HYPERCAPNIA: ICD-10-CM

## 2024-12-10 DIAGNOSIS — R91.8 MULTIPLE PULMONARY NODULES: ICD-10-CM

## 2024-12-10 PROCEDURE — G0463 HOSPITAL OUTPT CLINIC VISIT: HCPCS | Performed by: PHYSICIAN ASSISTANT

## 2024-12-10 RX ORDER — DOXYCYCLINE 100 MG/1
100 CAPSULE ORAL 2 TIMES DAILY
Qty: 10 CAPSULE | Refills: 1 | Status: SHIPPED | OUTPATIENT
Start: 2024-12-10 | End: 2024-12-15

## 2024-12-10 RX ORDER — ROFLUMILAST 500 UG/1
500 TABLET ORAL DAILY
Qty: 30 TABLET | Refills: 6 | Status: SHIPPED | OUTPATIENT
Start: 2024-12-10

## 2024-12-10 RX ORDER — PREDNISONE 20 MG/1
40 TABLET ORAL DAILY
Qty: 10 TABLET | Refills: 1 | Status: SHIPPED | OUTPATIENT
Start: 2024-12-10 | End: 2024-12-15

## 2024-12-10 NOTE — PROGRESS NOTES
[facility]     COPD Clinic    Patient Name: Federica Rahman  : 1955  MRN: [id number]  Primary Care Physician:  Bhupendra Lara APRN      Subjective   Subjective     Chief Complaint: Follow-up    History of Present Illness    Patient presents today for follow-up evaluation of COPD.  Accompanied by her significant other.  Ambulating in a power chair today.  Remains compliant with continuous supplemental oxygen use.  Uses POC for ambulation and continuous concentrator while at home.  Uses on 2 L typically.  NIPPV device was ordered due to underlying hypercapnia noted on previous ABGs, but insurance denied this as they need a more recent ABG.  She continues on Breztri via the [employer] program for financial assistance.  She ran out of her Daliresp tablet, and noted significant benefit even from the lowest dosage.  No known side effects.    Remains an everyday smoker with around 1 pack/day average for the past 50 years.        Objective   Objective     Vitals:   Vitals:    12/10/24 1431   BP: 116/70   Pulse: 81   SpO2: 94%         Physical Exam  Vitals reviewed.   Constitutional:       General: She is not in acute distress.     Appearance: She is not diaphoretic.      Comments: Small body habitus.   HENT:      Head: Normocephalic and atraumatic.   Cardiovascular:      Rate and Rhythm: Normal rate and regular rhythm.   Pulmonary:      Effort: Pulmonary effort is normal.      Breath sounds: No wheezing, rhonchi or rales.      Comments: Very distant breath sounds.  Neurological:      Mental Status: She is alert and oriented to person, place, and time.   Psychiatric:         Behavior: Behavior normal.             Result Review    Result Review:  I have personally reviewed the results from the time of this admission to 2024 11:23 EST and agree with these findings:  [x]  Laboratory list / accordion  []  Microbiology  [x]  Radiology  []  EKG/Telemetry   []  Cardiology/Vascular   []  Pathology  [x]   Old records  [x]  Other:  Most notable findings include:       CT chest imaging/report October 2023    -Diffuse emphysema changes  -Biapical scarring  -Image 22 - 6.1 mm left noncalcified pulmonary nodule, new  -right posterior medial cavitation, stable since 2015  -Image 73 - 4.6 mm calcified right nodule, stable since 2015  -Image 91 - 6.0 mm calcified right nodule, stable since 2015    -----------------------------------------------------------------------------------    Alpha 1 genotype, MM    -----------------------------------------------------------------------------------    Spirometry 2024    Very severe obstruction with FEV1/FVC of 34%.  FEV1 of 16%.      -----------------------------------------------------------------------------------    Previous labs including ABGs.          Assessment & Plan   Assessment / Plan        Diagnosis Plan   1. Stage 4 very severe COPD by GOLD classification  Blood Gas, Arterial -With Co-Ox Panel: Yes    roflumilast (Daliresp) 500 MCG tablet tablet     CT Chest Low Dose Cancer Screening WO      2. Chronic respiratory failure with hypoxia and hypercapnia  Blood Gas, Arterial -With Co-Ox Panel: Yes    roflumilast (Daliresp) 500 MCG tablet tablet      3. Cigarette nicotine dependence without complication   CT Chest Low Dose Cancer Screening WO      4. Multiple pulmonary nodules   CT Chest Low Dose Cancer Screening WO      5. Cavitary lesion of lung   CT Chest Low Dose Cancer Screening WO          Plan:       Very severe COPD:  Notable for stage IV obstruction per last spirometry, with FEV1 of 16%.  Alpha-1 genotype normal, MM  Albuterol inhaler as needed  Continue albuterol nebs as needed  Continue Breztri as scheduled  Obtained per [place] program.  May consider Ohtuvayre addition in the near future  Orders received back a 5-day prednisone 5-day doxycycline to take if needed for COPD exacerbation, which could easily lead to hospitalization for        Cigarette dependence:    Notable for 50 year history of 1 pack per day average.   Not yet able to achieve cessation.  Agreed to repeating LDCT screening, ordered today        Pulmonary nodules,   Cavitary lesion of the lung:   Last LDCT from October 2023 showed  -Diffuse emphysema changes, stable  -Biapical scarring, stable  -Image 22 - 6.1 mm left noncalcified pulmonary nodule, new  -right posterior medial cavitation, stable since 2015  -Image 73 - 4.6 mm calcified right nodule, stable since 2015  -Image 91 - 6.0 mm calcified right nodule, stable since 2015  Ordered new low dose CT.         Chronic hypoxic and hypercapnic respiratory failure:   Remains compliant with continuous use of supplemental oxygen on 2 L.  NIPPV device with ordered at the previous visit, but patient will need updated ABG within 30 days of ordering.  Last ABG was from 2023, but did show hypercapnia.   Ordered new ABG          Return in about 3 months (around 3/10/2025), or if symptoms worsen or fail to improve, for Recheck.     Jing Katz PA-C

## 2024-12-10 NOTE — PROGRESS NOTES
"COPD Education    NAME:___Federica Rahman  :_1955_  APPOINTMENT DATE/TIME:___________12/10/24___12:53 EDT___________    COPD Education Evaluation            COPD Education Completed (See Note) Yes     COPD Clinic encounter: 2nd    Referring/consulting Provider: Crystal Cespedes PA-C     Reason for Consultation:  COPD Exacerbation   COPD Diagnosis Length 20 years     Current Outpatient Pulmonologist     [place] and [facility]           Subjective            Spirometry      Spirometry Completed YES and 10/08/2024     FVC 37%     Fev1 16%     Fev1/FVC 34     GOLD Stage 4           Classification of Airflow Limitation Severity in COPD (Based on Post-Bronchodilator FEV1)           Gold 1: Mild FEV1 >= 80% predicted      Gold 2:  Moderate 50% <= FEV1 < 80% predicted   Gold 3: Severe 30% >= FEV1 < 50% predicted   Gold 4: Very Severe FEV1 < 30% predicted                Dyspnea:        Do you have Dyspnea? Yes and With Exertion.       DME Equipment Used:              Home O2? YES     Home O2 device? Home oxygen concentrator  POC     Nebulizer YES     NIPPV None      Objective:          Barriers to Learning? No    Discussed:             COPD education given via the booklet \"A Patient's Guide to COPD\".     COPD Zones: (Green/yellow/Red): YES     Exacerbation or Flare up signs and symptoms: YES       COPD Medication Non-Compliance YES       Managing Medications: YES     Patient understands use of Rescue Medications: YES     Type of Rescue inhaler patient currently has: Albuterol and MDI and nebulizer solution   Patient understands use of Maintenance Medications: YES     Type of Maintenance inhaler patient currently has: ICS/LABA/LAMA and Breztrie    Proper MDI technique (w/wo Spacer): YES     Provided patient a Spacer: YES and Patient understands proper technique very well, with and without SPACER       Breathing Techniques:            Purse-lipped breathing YES and Patient could teach me how to Purse-Lip breath          " Diaphragmatic breathing NO     Oxygen therapy SAFETY:  YES           Action Plan            Aerobika for sputum mobilization: NO     Rescue Inhaler ordered: NO     COPD Maintenance Inhaler ordered: YES and Daliresp 500 mcg     COPD/Lung Health Clinic follow up scheduled: YES and 03/05/2025     Education Minutes with patient: YES and 30 minutes       Goals Discussed with patient: Become smoke free., Take medications as ordered., GO to Dr. appointments., Increase activity., Eat healthier., Increase use of pursed lip breathing., Decrease flare-ups., and Increase COPD Knowledge.    Comments: Ms. Rahman presents to the COPD Clinic for a follow up visit. She was referred by Crystal Cespedes PA-C, during recent ED visit on 8/28/2024 for COPD Exacerbation. Since changing to Breztri for COPD maintenance, she states she can breath better. She also has Albuterol MDI and nebulizer solution for rescue. Ms. Rahman uses oxygen at 2L NC continuously via home oxygen concentrator and a POC.    During her previous visit, she completed:    -Spirometry: results shown above  -Alpha 1 Genotype sample collected and placed in the mail  -Began discussing COPD educational topics listed above.    She began using Breztri through [place] [employer] [place] and Daliresp 250 during her 1st visit.    ISAMAR Gonzales to order a Trilogy Machine for night time use.    Today, we:    -Continued discussing COPD Education  -Evaluated her adherence to Daliresp and Breztri. She reports great adherence. Jing Katz PA-C, to increase her Daliresp to 500 mcg.  -Evaluate Adherence to Trilogy. She was denied due to not having a recent ABG within 30 days. Jing ordered a ABG for her to complete.    When she returns for her next visit, she will:    -Attempt a 6MWT  -Continue COPD Education  -evaluate her adherence to NIPPV    Thank you for allowing me to participate in her care,    DIPIKA Becerra, RRT  COPD Navigator  12/10/24

## 2024-12-10 NOTE — PROGRESS NOTES
[facility]  COPD Management       Federica Rahman is a 69 y.o. female seen in the [facility] for COPD.  The patient's current COPD regimen includes: Breztri, PRN albuterol HFA, PRN albuterol nebs, and Daliresp 250 mcg daily. Pt reports good adherence to maintenance regimen. Pt reports she is a smoker and smokes about 0.25 PPD. This has decreased from smoking 1 PPD.     Federica Rahman reports that she has seen much improvement in her breathing since switching to Breztri and starting Daliresp. She says that she is rinsing her mouth out following Breztri use. She has been out of Daliresp for over a week and can already tell that her breathing is worse. She reports states that she does not even have to use her albuterol HFA or nebs every day.       Past Medical History:   Diagnosis Date    COPD (chronic obstructive pulmonary disease)     Osteoporosis      Social History     Socioeconomic History    Marital status:    Tobacco Use    Smoking status: Every Day     Current packs/day: 1.00     Average packs/day: 1 pack/day for 50.0 years (50.0 ttl pk-yrs)     Types: Cigarettes    Smokeless tobacco: Never   Vaping Use    Vaping status: Never Used   Substance and Sexual Activity    Alcohol use: No    Drug use: No    Sexual activity: Defer     Other    Current Outpatient Medications:     albuterol (PROVENTIL) (2.5 MG/3ML) 0.083% nebulizer solution, USE ONE VIAL IN NEBULIZER EVERY 4 HOURS AS NEEDED FOR  SHORTNESS OF AIR, Disp: 30 vial, Rfl: 2    amLODIPine (NORVASC) 5 MG tablet, Take 1 tablet by mouth Daily., Disp: , Rfl:     Budeson-Glycopyrrol-Formoterol (Breztri Aerosphere) 160-9-4.8 MCG/ACT aerosol inhaler, Inhale 2 puffs 2 (Two) Times a Day., Disp: 10.7 g, Rfl: 11    escitalopram (LEXAPRO) 10 MG tablet, Take 1 tablet by mouth Daily., Disp: , Rfl:     metoprolol tartrate (LOPRESSOR) 25 MG tablet, Take 1 tablet by mouth 2 (Two) Times a Day., Disp: 180 tablet, Rfl: 1     nitroglycerin (NITROSTAT) 0.4 MG SL tablet, Place 1 tablet under the tongue Every 5 (Five) Minutes As Needed for Chest Pain. Take no more than 3 doses in 15 minutes., Disp: 25 tablet, Rfl: 0    roflumilast (Daliresp) 250 MCG tablet tablet, Take 1 tablet by mouth Daily., Disp: 28 tablet, Rfl: 0    VENTOLIN  (90 BASE) MCG/ACT inhaler, Inhale 2 puffs Every 4 (Four) Hours As Needed for shortness of air., Disp: 1 inhaler, Rfl: 2    vitamin D (ERGOCALCIFEROL) 1.25 MG (27543 UT) capsule capsule, Take 1 capsule by mouth 1 (One) Time Per Week., Disp: , Rfl:     rosuvastatin (Crestor) 20 MG tablet, Take 1 tablet by mouth Daily. (Patient not taking: Reported on 12/10/2024), Disp: 30 tablet, Rfl: 0        Vaccination Status   COVID 19: UTD  Influenza: Plans to get tomorrow at PCP  Pneumococcal: Reports UTD  Zoster: Declined    Drug-Drug Interactions: No significant interactions    Drug-Disease Interactions (non-cardioselective beta blockers): N/A    Patient Assistance: Breztri through [employer]; Dairesp $2.02/month (Fills at [facility])    Inhaler Technique Observed? No  If yes, notes:     Treatment Goals: Risk Reduction and Symptom Control     Medication Assessment & Plan:     Patient will continue Breztri 2 puffs BID (receiving through [employer]). Jing to increase Daliresp to 500 mcg daily.    Advised Pt on the importance of continuing maintenance inhaler regimen and the importance of rinsing mouth after ICS use.     Counseled Pt on the importance of smoking cessation & offered referral to [facility] smoking cessation program. Patient declined at this time and plans to cut back on her own.      Recommended influenza vaccine. Patient planning to get at PCP office tomorrow.    Thank you for allowing me to participate in the care of your patient,    Alicia Prince, MaciD  12/10/2024  15:28 EST

## 2025-01-11 ENCOUNTER — APPOINTMENT (OUTPATIENT)
Dept: CT IMAGING | Facility: HOSPITAL | Age: 70
End: 2025-01-11
Payer: MEDICARE

## 2025-01-11 ENCOUNTER — APPOINTMENT (OUTPATIENT)
Dept: GENERAL RADIOLOGY | Facility: HOSPITAL | Age: 70
End: 2025-01-11
Payer: MEDICARE

## 2025-01-11 ENCOUNTER — HOSPITAL ENCOUNTER (OUTPATIENT)
Facility: HOSPITAL | Age: 70
Setting detail: OBSERVATION
Discharge: HOME | End: 2025-01-12
Attending: EMERGENCY MEDICINE | Admitting: STUDENT IN AN ORGANIZED HEALTH CARE EDUCATION/TRAINING PROGRAM
Payer: MEDICARE

## 2025-01-11 DIAGNOSIS — I48.91 ATRIAL FIBRILLATION WITH RVR: Primary | ICD-10-CM

## 2025-01-11 LAB
ALBUMIN SERPL-MCNC: 4 G/DL (ref 3.5–5.2)
ALBUMIN/GLOB SERPL: 1.5 G/DL
ALP SERPL-CCNC: 96 U/L (ref 39–117)
ALT SERPL W P-5'-P-CCNC: 13 U/L (ref 1–33)
ANION GAP SERPL CALCULATED.3IONS-SCNC: 8.3 MMOL/L (ref 5–15)
APTT PPP: 28.2 SECONDS (ref 26.5–34.5)
AST SERPL-CCNC: 15 U/L (ref 1–32)
BASOPHILS # BLD AUTO: 0.06 10*3/MM3 (ref 0–0.2)
BASOPHILS NFR BLD AUTO: 0.9 % (ref 0–1.5)
BILIRUB SERPL-MCNC: <0.2 MG/DL (ref 0–1.2)
BUN SERPL-MCNC: 9 MG/DL (ref 8–23)
BUN/CREAT SERPL: 14.3 (ref 7–25)
CALCIUM SPEC-SCNC: 9.6 MG/DL (ref 8.6–10.5)
CHLORIDE SERPL-SCNC: 101 MMOL/L (ref 98–107)
CO2 SERPL-SCNC: 32.7 MMOL/L (ref 22–29)
CREAT SERPL-MCNC: 0.63 MG/DL (ref 0.57–1)
CRP SERPL-MCNC: <0.3 MG/DL (ref 0–0.5)
DEPRECATED RDW RBC AUTO: 45.8 FL (ref 37–54)
EGFRCR SERPLBLD CKD-EPI 2021: 96.2 ML/MIN/1.73
EOSINOPHIL # BLD AUTO: 0.15 10*3/MM3 (ref 0–0.4)
EOSINOPHIL NFR BLD AUTO: 2.2 % (ref 0.3–6.2)
ERYTHROCYTE [DISTWIDTH] IN BLOOD BY AUTOMATED COUNT: 12.7 % (ref 12.3–15.4)
GEN 5 1HR TROPONIN T REFLEX: 15 NG/L
GLOBULIN UR ELPH-MCNC: 2.7 GM/DL
GLUCOSE SERPL-MCNC: 100 MG/DL (ref 65–99)
HCT VFR BLD AUTO: 42.6 % (ref 34–46.6)
HGB BLD-MCNC: 13.3 G/DL (ref 12–15.9)
IMM GRANULOCYTES # BLD AUTO: 0.01 10*3/MM3 (ref 0–0.05)
IMM GRANULOCYTES NFR BLD AUTO: 0.1 % (ref 0–0.5)
INR PPP: 0.83 (ref 0.9–1.1)
LYMPHOCYTES # BLD AUTO: 1.5 10*3/MM3 (ref 0.7–3.1)
LYMPHOCYTES NFR BLD AUTO: 22 % (ref 19.6–45.3)
MAGNESIUM SERPL-MCNC: 1.9 MG/DL (ref 1.6–2.4)
MCH RBC QN AUTO: 30.2 PG (ref 26.6–33)
MCHC RBC AUTO-ENTMCNC: 31.2 G/DL (ref 31.5–35.7)
MCV RBC AUTO: 96.6 FL (ref 79–97)
MONOCYTES # BLD AUTO: 1.02 10*3/MM3 (ref 0.1–0.9)
MONOCYTES NFR BLD AUTO: 15 % (ref 5–12)
NEUTROPHILS NFR BLD AUTO: 4.08 10*3/MM3 (ref 1.7–7)
NEUTROPHILS NFR BLD AUTO: 59.8 % (ref 42.7–76)
NRBC BLD AUTO-RTO: 0 /100 WBC (ref 0–0.2)
PLATELET # BLD AUTO: 325 10*3/MM3 (ref 140–450)
PMV BLD AUTO: 9 FL (ref 6–12)
POTASSIUM SERPL-SCNC: 4 MMOL/L (ref 3.5–5.2)
PROT SERPL-MCNC: 6.7 G/DL (ref 6–8.5)
PROTHROMBIN TIME: 11.5 SECONDS (ref 12.1–14.7)
RBC # BLD AUTO: 4.41 10*6/MM3 (ref 3.77–5.28)
SODIUM SERPL-SCNC: 142 MMOL/L (ref 136–145)
T4 FREE SERPL-MCNC: 1.06 NG/DL (ref 0.92–1.68)
TROPONIN T NUMERIC DELTA: 3 NG/L
TROPONIN T SERPL HS-MCNC: 12 NG/L
TSH SERPL DL<=0.05 MIU/L-ACNC: 1.64 UIU/ML (ref 0.27–4.2)
WBC NRBC COR # BLD AUTO: 6.82 10*3/MM3 (ref 3.4–10.8)

## 2025-01-11 PROCEDURE — 25510000001 IOPAMIDOL PER 1 ML

## 2025-01-11 PROCEDURE — 99285 EMERGENCY DEPT VISIT HI MDM: CPT

## 2025-01-11 PROCEDURE — 71045 X-RAY EXAM CHEST 1 VIEW: CPT | Performed by: RADIOLOGY

## 2025-01-11 PROCEDURE — 96365 THER/PROPH/DIAG IV INF INIT: CPT

## 2025-01-11 PROCEDURE — 71045 X-RAY EXAM CHEST 1 VIEW: CPT

## 2025-01-11 PROCEDURE — 25010000002 DIPHENHYDRAMINE PER 50 MG: Performed by: EMERGENCY MEDICINE

## 2025-01-11 PROCEDURE — 85025 COMPLETE CBC W/AUTO DIFF WBC: CPT | Performed by: EMERGENCY MEDICINE

## 2025-01-11 PROCEDURE — 71275 CT ANGIOGRAPHY CHEST: CPT | Performed by: RADIOLOGY

## 2025-01-11 PROCEDURE — G0378 HOSPITAL OBSERVATION PER HR: HCPCS

## 2025-01-11 PROCEDURE — 83735 ASSAY OF MAGNESIUM: CPT | Performed by: EMERGENCY MEDICINE

## 2025-01-11 PROCEDURE — 25010000002 HEPARIN (PORCINE) PER 1000 UNITS: Performed by: INTERNAL MEDICINE

## 2025-01-11 PROCEDURE — 86140 C-REACTIVE PROTEIN: CPT | Performed by: EMERGENCY MEDICINE

## 2025-01-11 PROCEDURE — 96375 TX/PRO/DX INJ NEW DRUG ADDON: CPT

## 2025-01-11 PROCEDURE — 96366 THER/PROPH/DIAG IV INF ADDON: CPT

## 2025-01-11 PROCEDURE — 96372 THER/PROPH/DIAG INJ SC/IM: CPT

## 2025-01-11 PROCEDURE — 84443 ASSAY THYROID STIM HORMONE: CPT | Performed by: EMERGENCY MEDICINE

## 2025-01-11 PROCEDURE — 71275 CT ANGIOGRAPHY CHEST: CPT

## 2025-01-11 PROCEDURE — 99223 1ST HOSP IP/OBS HIGH 75: CPT

## 2025-01-11 PROCEDURE — 85730 THROMBOPLASTIN TIME PARTIAL: CPT | Performed by: EMERGENCY MEDICINE

## 2025-01-11 PROCEDURE — 93010 ELECTROCARDIOGRAM REPORT: CPT | Performed by: INTERNAL MEDICINE

## 2025-01-11 PROCEDURE — 36415 COLL VENOUS BLD VENIPUNCTURE: CPT

## 2025-01-11 PROCEDURE — 84439 ASSAY OF FREE THYROXINE: CPT | Performed by: EMERGENCY MEDICINE

## 2025-01-11 PROCEDURE — 85610 PROTHROMBIN TIME: CPT | Performed by: EMERGENCY MEDICINE

## 2025-01-11 PROCEDURE — 93005 ELECTROCARDIOGRAM TRACING: CPT | Performed by: EMERGENCY MEDICINE

## 2025-01-11 PROCEDURE — 25810000003 SODIUM CHLORIDE 0.9 % SOLUTION: Performed by: EMERGENCY MEDICINE

## 2025-01-11 PROCEDURE — 93005 ELECTROCARDIOGRAM TRACING: CPT | Performed by: INTERNAL MEDICINE

## 2025-01-11 PROCEDURE — 80053 COMPREHEN METABOLIC PANEL: CPT | Performed by: EMERGENCY MEDICINE

## 2025-01-11 PROCEDURE — 84484 ASSAY OF TROPONIN QUANT: CPT | Performed by: EMERGENCY MEDICINE

## 2025-01-11 RX ORDER — SODIUM CHLORIDE 0.9 % (FLUSH) 0.9 %
10 SYRINGE (ML) INJECTION EVERY 12 HOURS SCHEDULED
Status: DISCONTINUED | OUTPATIENT
Start: 2025-01-11 | End: 2025-01-12 | Stop reason: HOSPADM

## 2025-01-11 RX ORDER — DIPHENHYDRAMINE HYDROCHLORIDE 50 MG/ML
25 INJECTION INTRAMUSCULAR; INTRAVENOUS ONCE
Status: COMPLETED | OUTPATIENT
Start: 2025-01-11 | End: 2025-01-11

## 2025-01-11 RX ORDER — SODIUM CHLORIDE 0.9 % (FLUSH) 0.9 %
10 SYRINGE (ML) INJECTION AS NEEDED
Status: DISCONTINUED | OUTPATIENT
Start: 2025-01-11 | End: 2025-01-12 | Stop reason: HOSPADM

## 2025-01-11 RX ORDER — IOPAMIDOL 755 MG/ML
100 INJECTION, SOLUTION INTRAVASCULAR
Status: COMPLETED | OUTPATIENT
Start: 2025-01-11 | End: 2025-01-11

## 2025-01-11 RX ORDER — HEPARIN SODIUM 5000 [USP'U]/ML
5000 INJECTION, SOLUTION INTRAVENOUS; SUBCUTANEOUS EVERY 12 HOURS SCHEDULED
Status: DISCONTINUED | OUTPATIENT
Start: 2025-01-11 | End: 2025-01-12 | Stop reason: HOSPADM

## 2025-01-11 RX ORDER — METOPROLOL TARTRATE 25 MG/1
25 TABLET, FILM COATED ORAL EVERY 12 HOURS SCHEDULED
Status: DISCONTINUED | OUTPATIENT
Start: 2025-01-11 | End: 2025-01-12 | Stop reason: HOSPADM

## 2025-01-11 RX ORDER — NITROGLYCERIN 0.4 MG/1
0.4 TABLET SUBLINGUAL
Status: DISCONTINUED | OUTPATIENT
Start: 2025-01-11 | End: 2025-01-12 | Stop reason: HOSPADM

## 2025-01-11 RX ORDER — SODIUM CHLORIDE 9 MG/ML
40 INJECTION, SOLUTION INTRAVENOUS AS NEEDED
Status: DISCONTINUED | OUTPATIENT
Start: 2025-01-11 | End: 2025-01-12 | Stop reason: HOSPADM

## 2025-01-11 RX ADMIN — IOPAMIDOL 70 ML: 755 INJECTION, SOLUTION INTRAVENOUS at 19:46

## 2025-01-11 RX ADMIN — METOPROLOL TARTRATE 25 MG: 25 TABLET, FILM COATED ORAL at 22:30

## 2025-01-11 RX ADMIN — DIPHENHYDRAMINE HYDROCHLORIDE 25 MG: 50 INJECTION, SOLUTION INTRAMUSCULAR; INTRAVENOUS at 18:34

## 2025-01-11 RX ADMIN — SODIUM CHLORIDE 1000 ML: 9 INJECTION, SOLUTION INTRAVENOUS at 17:56

## 2025-01-11 RX ADMIN — HEPARIN SODIUM 5000 UNITS: 5000 INJECTION INTRAVENOUS; SUBCUTANEOUS at 22:30

## 2025-01-11 NOTE — ED PROVIDER NOTES
Subjective   History of Present Illness  69-year-old female brought in here for palpitations with a elevated heart rate.  Patient reports that she woke up this morning with a fast heart rate and it did not improve throughout the day.  Has a history of atrial fibrillation and takes all the appropriate medications.  Denies any recent illness and/or change to her medication regimen.  Denies being on long-term anticoagulation but does take a daily aspirin.  Has no complaints other than a fast heart rate and shortness of breath which is at her baseline on 2 L of oxygen for COPD.    History provided by:  Patient   used: No    Palpitations  Palpitations quality:  Irregular  Onset quality:  Gradual  Duration: Several.  Timing:  Constant  Progression:  Worsening  Chronicity:  Recurrent  Context: not anxiety, not appetite suppressants, not blood loss, not bronchodilators, not caffeine, not dehydration, not exercise, not hyperventilation, not illicit drugs, not nicotine and not stimulant use    Relieved by:  Nothing  Worsened by:  Nothing  Ineffective treatments:  None tried  Associated symptoms: no back pain, no chest pain, no chest pressure, no cough, no diaphoresis, no dizziness, no hemoptysis, no leg pain, no lower extremity edema, no malaise/fatigue, no nausea, no near-syncope, no numbness, no orthopnea, no PND, no shortness of breath, no syncope, no vomiting and no weakness    Risk factors: no diabetes mellitus, no heart disease, no hx of atrial fibrillation, no hx of DVT, no hx of PE, no hx of thyroid disease, no hypercoagulable state, no hyperthyroidism, no OTC sinus medications and no stress        Review of Systems   Constitutional:  Negative for activity change, appetite change, chills, diaphoresis, fatigue, fever and malaise/fatigue.   HENT:  Negative for congestion, ear pain and sore throat.    Eyes:  Negative for redness.   Respiratory:  Negative for cough, hemoptysis, chest tightness,  shortness of breath and wheezing.    Cardiovascular:  Positive for palpitations. Negative for chest pain, orthopnea, leg swelling, syncope, PND and near-syncope.   Gastrointestinal:  Negative for abdominal pain, diarrhea, nausea and vomiting.   Genitourinary:  Negative for dysuria and urgency.   Musculoskeletal:  Negative for arthralgias, back pain, myalgias and neck pain.   Skin:  Negative for pallor, rash and wound.   Neurological:  Negative for dizziness, speech difficulty, weakness, numbness and headaches.   Psychiatric/Behavioral:  Negative for agitation, behavioral problems, confusion and decreased concentration.    All other systems reviewed and are negative.      Past Medical History:   Diagnosis Date    COPD (chronic obstructive pulmonary disease)     Osteoporosis        Allergies   Allergen Reactions    Other        Past Surgical History:   Procedure Laterality Date    APPENDECTOMY       SECTION      SHOULDER SURGERY         Family History   Problem Relation Age of Onset    Heart disease Father     COPD Father     Diabetes Father        Social History     Socioeconomic History    Marital status:    Tobacco Use    Smoking status: Every Day     Current packs/day: 1.00     Average packs/day: 1 pack/day for 50.0 years (50.0 ttl pk-yrs)     Types: Cigarettes    Smokeless tobacco: Never   Vaping Use    Vaping status: Never Used   Substance and Sexual Activity    Alcohol use: No    Drug use: No    Sexual activity: Defer           Objective   Physical Exam  Vitals and nursing note reviewed.   Constitutional:       General: She is not in acute distress.     Appearance: Normal appearance. She is well-developed. She is not toxic-appearing or diaphoretic.   HENT:      Head: Normocephalic and atraumatic.      Right Ear: External ear normal.      Left Ear: External ear normal.      Nose: Nose normal.      Mouth/Throat:      Pharynx: No oropharyngeal exudate.      Tonsils: No tonsillar exudate.   Eyes:       General: Lids are normal.      Conjunctiva/sclera: Conjunctivae normal.      Pupils: Pupils are equal, round, and reactive to light.   Neck:      Thyroid: No thyromegaly.   Cardiovascular:      Rate and Rhythm: Tachycardia present. Rhythm irregular.      Pulses: Normal pulses.      Heart sounds: Normal heart sounds, S1 normal and S2 normal.   Pulmonary:      Effort: Pulmonary effort is normal. No tachypnea or respiratory distress.      Breath sounds: Normal breath sounds. No decreased breath sounds, wheezing or rales.   Chest:      Chest wall: No tenderness.   Abdominal:      General: Bowel sounds are normal. There is no distension.      Palpations: Abdomen is soft.      Tenderness: There is no abdominal tenderness. There is no guarding or rebound.   Musculoskeletal:         General: No tenderness or deformity. Normal range of motion.      Cervical back: Full passive range of motion without pain, normal range of motion and neck supple.   Lymphadenopathy:      Cervical: No cervical adenopathy.   Skin:     General: Skin is warm and dry.      Coloration: Skin is not pale.      Findings: No erythema or rash.   Neurological:      Mental Status: She is alert and oriented to person, place, and time.      GCS: GCS eye subscore is 4. GCS verbal subscore is 5. GCS motor subscore is 6.      Cranial Nerves: No cranial nerve deficit.      Sensory: No sensory deficit.   Psychiatric:         Speech: Speech normal.         Behavior: Behavior normal.         Thought Content: Thought content normal.         Judgment: Judgment normal.         Procedures           ED Course  ED Course as of 01/12/25 0305   Sat Jan 11, 2025   1755 ECG 12 Lead Chest Pain  Vent. Rate : 156 BPM     Atrial Rate : 170 BPM     P-R Int :   * ms          QRS Dur :  66 ms      QT Int : 270 ms       P-R-T Axes :   *  51  77 degrees    QTcB Int : 435 ms     ** Critical Test Result: High HR  Atrial fibrillation with rapid ventricular response  Nonspecific ST and  T wave abnormality  Abnormal ECG  When compared with ECG of 28-Aug-2024 10:33,  Significant changes have occurred   [ES]   1853 Patient will need to be admitted for atrial fibrillation with rapid ventricular response. [ES]   1910 Chest x-ray no acute disease.  Glucose is 100 and CO2 is 32.  White count 6.8 and H&H was 13 and 42.  TSH is normal troponin is normal. [GP]      ED Course User Index  [ES] Chiki Marie MD  [GP] Freddy Vizcarra MD                                                       Medical Decision Making  Patient with atrial FaBB RVR on a Cardizem drip.  Will try and get her admitted.    Problems Addressed:  Atrial fibrillation with RVR: complicated acute illness or injury    Amount and/or Complexity of Data Reviewed  Labs: ordered.  Radiology: ordered.  ECG/medicine tests: ordered. Decision-making details documented in ED Course.    Risk  Prescription drug management.  Decision regarding hospitalization.        Final diagnoses:   Atrial fibrillation with RVR       ED Disposition  ED Disposition       ED Disposition   Decision to Admit    Condition   --    Comment   Level of Care: Telemetry [5]   Diagnosis: A-fib [[id number]]   Admitting Physician: PAM HINTON [[id number]]                 No follow-up provider specified.       Medication List        ASK your doctor about these medications      rosuvastatin 20 MG tablet  Commonly known as: CRESTOR  Ask about: Which instructions should I use?                 Freddy Vizcarra MD  01/12/25 5269

## 2025-01-12 ENCOUNTER — APPOINTMENT (OUTPATIENT)
Dept: CARDIOLOGY | Facility: HOSPITAL | Age: 70
End: 2025-01-12
Payer: MEDICARE

## 2025-01-12 VITALS
WEIGHT: 123.9 LBS | SYSTOLIC BLOOD PRESSURE: 102 MMHG | DIASTOLIC BLOOD PRESSURE: 60 MMHG | HEIGHT: 66 IN | HEART RATE: 75 BPM | RESPIRATION RATE: 18 BRPM | OXYGEN SATURATION: 98 % | BODY MASS INDEX: 19.91 KG/M2 | TEMPERATURE: 97.9 F

## 2025-01-12 LAB
AV MEAN PRESS GRAD SYS DOP V1V2: 4 MMHG
AV VMAX SYS DOP: 139 CM/SEC
BH CV ECHO MEAS - ACS: 1.8 CM
BH CV ECHO MEAS - AO MAX PG: 7.7 MMHG
BH CV ECHO MEAS - AO ROOT DIAM: 3 CM
BH CV ECHO MEAS - AO V2 VTI: 27 CM
BH CV ECHO MEAS - AVA(I,D): 2.8 CM2
BH CV ECHO MEAS - EDV(CUBED): 42.9 ML
BH CV ECHO MEAS - EDV(MOD-SP2): 56.7 ML
BH CV ECHO MEAS - EDV(MOD-SP4): 43 ML
BH CV ECHO MEAS - EF(MOD-SP2): 60.5 %
BH CV ECHO MEAS - EF(MOD-SP4): 63 %
BH CV ECHO MEAS - ESV(CUBED): 12.2 ML
BH CV ECHO MEAS - ESV(MOD-SP2): 22.4 ML
BH CV ECHO MEAS - ESV(MOD-SP4): 15.9 ML
BH CV ECHO MEAS - FS: 34.3 %
BH CV ECHO MEAS - IVS/LVPW: 1 CM
BH CV ECHO MEAS - IVSD: 1.1 CM
BH CV ECHO MEAS - LA DIMENSION: 2 CM
BH CV ECHO MEAS - LAT PEAK E' VEL: 13.4 CM/SEC
BH CV ECHO MEAS - LV DIASTOLIC VOL/BSA (35-75): 26.4 CM2
BH CV ECHO MEAS - LV MASS(C)D: 119 GRAMS
BH CV ECHO MEAS - LV MAX PG: 6.6 MMHG
BH CV ECHO MEAS - LV MEAN PG: 3 MMHG
BH CV ECHO MEAS - LV SYSTOLIC VOL/BSA (12-30): 9.8 CM2
BH CV ECHO MEAS - LV V1 MAX: 128 CM/SEC
BH CV ECHO MEAS - LV V1 VTI: 26.3 CM
BH CV ECHO MEAS - LVIDD: 3.5 CM
BH CV ECHO MEAS - LVIDS: 2.3 CM
BH CV ECHO MEAS - LVOT AREA: 2.8 CM2
BH CV ECHO MEAS - LVOT DIAM: 1.9 CM
BH CV ECHO MEAS - LVPWD: 1.1 CM
BH CV ECHO MEAS - MED PEAK E' VEL: 8.3 CM/SEC
BH CV ECHO MEAS - MR MAX PG: 14.9 MMHG
BH CV ECHO MEAS - MR MAX VEL: 193 CM/SEC
BH CV ECHO MEAS - MV A MAX VEL: 112 CM/SEC
BH CV ECHO MEAS - MV DEC SLOPE: 485 CM/SEC2
BH CV ECHO MEAS - MV DEC TIME: 0.22 SEC
BH CV ECHO MEAS - MV E MAX VEL: 106 CM/SEC
BH CV ECHO MEAS - MV E/A: 0.95
BH CV ECHO MEAS - MV MAX PG: 4.8 MMHG
BH CV ECHO MEAS - MV MEAN PG: 3 MMHG
BH CV ECHO MEAS - MV V2 VTI: 28.8 CM
BH CV ECHO MEAS - MVA(VTI): 2.6 CM2
BH CV ECHO MEAS - PA ACC TIME: 0.09 SEC
BH CV ECHO MEAS - PA V2 MAX: 100 CM/SEC
BH CV ECHO MEAS - RAP SYSTOLE: 10 MMHG
BH CV ECHO MEAS - RVSP: 23.2 MMHG
BH CV ECHO MEAS - SV(LVOT): 74.6 ML
BH CV ECHO MEAS - SV(MOD-SP2): 34.3 ML
BH CV ECHO MEAS - SV(MOD-SP4): 27.1 ML
BH CV ECHO MEAS - SVI(LVOT): 45.8 ML/M2
BH CV ECHO MEAS - SVI(MOD-SP2): 21.1 ML/M2
BH CV ECHO MEAS - SVI(MOD-SP4): 16.7 ML/M2
BH CV ECHO MEAS - TAPSE (>1.6): 2.8 CM
BH CV ECHO MEAS - TR MAX PG: 13.2 MMHG
BH CV ECHO MEAS - TR MAX VEL: 182 CM/SEC
BH CV ECHO MEASUREMENTS AVERAGE E/E' RATIO: 9.77
LEFT ATRIUM VOLUME INDEX: 30.4 ML/M2
LV EF BIPLANE MOD: 61 %
QT INTERVAL: 270 MS
QT INTERVAL: 352 MS
QT INTERVAL: 388 MS
QTC INTERVAL: 423 MS
QTC INTERVAL: 435 MS
QTC INTERVAL: 444 MS

## 2025-01-12 PROCEDURE — 93010 ELECTROCARDIOGRAM REPORT: CPT | Performed by: INTERNAL MEDICINE

## 2025-01-12 PROCEDURE — G0378 HOSPITAL OBSERVATION PER HR: HCPCS

## 2025-01-12 PROCEDURE — 96372 THER/PROPH/DIAG INJ SC/IM: CPT

## 2025-01-12 PROCEDURE — 93306 TTE W/DOPPLER COMPLETE: CPT

## 2025-01-12 PROCEDURE — 93306 TTE W/DOPPLER COMPLETE: CPT | Performed by: INTERNAL MEDICINE

## 2025-01-12 PROCEDURE — 25010000002 HEPARIN (PORCINE) PER 1000 UNITS: Performed by: INTERNAL MEDICINE

## 2025-01-12 PROCEDURE — 94799 UNLISTED PULMONARY SVC/PX: CPT

## 2025-01-12 PROCEDURE — 94761 N-INVAS EAR/PLS OXIMETRY MLT: CPT

## 2025-01-12 PROCEDURE — 96366 THER/PROPH/DIAG IV INF ADDON: CPT

## 2025-01-12 PROCEDURE — 93005 ELECTROCARDIOGRAM TRACING: CPT | Performed by: INTERNAL MEDICINE

## 2025-01-12 RX ORDER — ROSUVASTATIN CALCIUM 20 MG/1
20 TABLET, COATED ORAL DAILY
COMMUNITY

## 2025-01-12 RX ORDER — ROSUVASTATIN CALCIUM 20 MG/1
20 TABLET, COATED ORAL DAILY
Status: DISCONTINUED | OUTPATIENT
Start: 2025-01-12 | End: 2025-01-12 | Stop reason: HOSPADM

## 2025-01-12 RX ORDER — ROFLUMILAST 500 UG/1
500 TABLET ORAL DAILY
Status: DISCONTINUED | OUTPATIENT
Start: 2025-01-12 | End: 2025-01-12 | Stop reason: HOSPADM

## 2025-01-12 RX ORDER — METOPROLOL TARTRATE 25 MG/1
25 TABLET, FILM COATED ORAL 2 TIMES DAILY
Status: CANCELLED | OUTPATIENT
Start: 2025-01-12

## 2025-01-12 RX ORDER — AMLODIPINE BESYLATE 5 MG/1
5 TABLET ORAL DAILY
Status: DISCONTINUED | OUTPATIENT
Start: 2025-01-12 | End: 2025-01-12 | Stop reason: HOSPADM

## 2025-01-12 RX ORDER — ACETAMINOPHEN 325 MG/1
650 TABLET ORAL EVERY 6 HOURS PRN
Status: DISCONTINUED | OUTPATIENT
Start: 2025-01-12 | End: 2025-01-12 | Stop reason: HOSPADM

## 2025-01-12 RX ORDER — ESCITALOPRAM OXALATE 10 MG/1
10 TABLET ORAL DAILY
Status: DISCONTINUED | OUTPATIENT
Start: 2025-01-12 | End: 2025-01-12 | Stop reason: HOSPADM

## 2025-01-12 RX ADMIN — AMLODIPINE BESYLATE 5 MG: 5 TABLET ORAL at 08:16

## 2025-01-12 RX ADMIN — METOPROLOL TARTRATE 25 MG: 25 TABLET, FILM COATED ORAL at 08:15

## 2025-01-12 RX ADMIN — Medication 10 ML: at 08:17

## 2025-01-12 RX ADMIN — ROSUVASTATIN 20 MG: 20 TABLET, FILM COATED ORAL at 08:16

## 2025-01-12 RX ADMIN — ROFLUMILAST 500 MCG: 500 TABLET ORAL at 08:16

## 2025-01-12 RX ADMIN — HEPARIN SODIUM 5000 UNITS: 5000 INJECTION INTRAVENOUS; SUBCUTANEOUS at 08:17

## 2025-01-12 RX ADMIN — OFLOXACIN 50000 UNITS: 300 TABLET, COATED ORAL at 08:15

## 2025-01-12 RX ADMIN — ESCITALOPRAM OXALATE 10 MG: 10 TABLET ORAL at 08:15

## 2025-01-12 NOTE — PLAN OF CARE
Goal Outcome Evaluation:  Problem: Adult Inpatient Plan of Care  Goal: Plan of Care Review  Outcome: Progressing  Flowsheets (Taken 1/12/2025 1632)  Progress: improving  Outcome Evaluation: Patient sitting up in bed. NAD noted. IV patent. Family at bedside. Patient has remained in SR throughout shift. Echo performed this shift. Ambulating to bathroom well. POC ongoing.  Plan of Care Reviewed With: patient

## 2025-01-12 NOTE — PLAN OF CARE
Goal Outcome Evaluation:                 Pt currently resting in bed. No s/s of acute distress noted at this time. No complaints verbalized at this time. Plan of care ongoing.

## 2025-01-12 NOTE — H&P
"  [facility] Medicine Services  History & Physical    Patient Identification:  Name:  Federica Rahman  Age:  69 y.o.  Sex:  female  :  1955  MRN:  [id number]   Visit Number:  [id number]  Admit Date: 2025   Primary Care Physician:  Bhupendra Lara APRN    Subjective     Chief complaint: Palpitations    History of presenting illness:      Federica Rahman is a 69 y.o. female with past medical history significant for COPD, chronic respiratory failure on 2L nasal cannula continuously at baseline, former tobacco abuse, paroxysmal atrial fibrillation, non-sustained ventricular tachycardia, essential hypertension, mixed hyperlipidemia, and osteoporosis. Patient presents to [facility] for further evaluation of palpitations. Patient states that she felt like she was in Afib since this morning and symptoms did not improve, therefore she thought it best to seek treatment in the ED. She states that she did have some dizzy spells throughout the day although she denies any chest pain. Patient states that she is \"short of breath all the time because of COPD\". She states that shortness of breath is no worse than usual. She denies any acute cough, hemoptysis, wheezing, fever, or chills. She wears 2L nasal cannula at all times at home. Currently on baseline O2 requirement with adequate SpO2 saturations. Patient states that she does have a history of Afib; states that she takes aspirin and metoprolol and is compliant with current regimen. Reports that she took her metoprolol this morning however did not have the chance to take the evening dose while in the ER. She denies being on blood thinner; she is unsure why she is not on a blood thinner. Denies hx of GI bleed, nose bleeds, or acute anemia requiring blood transfusions. Patient states that she was feeling normal until this morning when she began to experience palpitations. Patient was found to be in Afib RVR " in the ED; was subsequently placed on a Cardizem drip at 10 mg/hr. She was given oral metoprolol at a later time. Converted to NSR after arrival to the floor and EKG was obtained. Discussed admission plans with patient. She voiced understanding and agreement with no further questions or concerns at this time.     Upon arrival to the ED, vital signs were temperature 98, pulse 172, respirations 16, blood pressure 87/70 sitting, SpO2 saturation 95%.  High-sensitivity troponin T 12 with 8+3 numeric delta.  CMP with CO2 32.7, glucose 100, otherwise unremarkable.  TSH and free T4 normal.  CRP negative.  PT 11.5, INR 0.83.  CBC with MCHC 31.2, otherwise unremarkable.  Chest x-ray noted no consolidative airspace disease.  There were linear opacities of the right upper lobe which may represent atelectasis and/or pneumonia.  Increased bronchial markings may represent acute and/or chronic bronchitis.  Emphysema with hyperinflation, stable.  CT angiogram of the chest noted bronchial inflammation.  Small areas of cavitation in the superior segment of the right lower lobe, could represent underlying chronic infectious process or underlying neoplastic process with adjacent pulmonary parenchymal scarring and/or spiculation from underlying neoplastic lesion.  No thoracic aortic aneurysm or dissection.  No pulmonary embolus.  No pericardial effusion.  Small mediastinal and small bilateral hilar nodes.  Right lower lobe calcified granuloma.  No acute process seen in the upper abdomen.    Dr. Jeronimo:  Patient seen and examined independently this morning.  She denies any complaints apart from hunger.  Patient has not missed any home doses of metoprolol prior to her arrival in the ED.  She has not had to use any additional albuterol at home.Patient follows with Dr. Aldana, local cardiologist.  She is unclear as to why she is not on anticoagulation; [id number] is approximately 3.  Echocardiogram ordered; mag 1.9 and potassium 4.    On  "exam, patient's heart is regular rate and rhythm, no murmur.  Lungs are with occasional end expiratory wheezes heard bilaterally.  Neurologically patient is intact.    In regards to the patient's CT chest findings; these necrotic appearing cavitary areas in the right lower lobe were also noted as far back as 2017.  Patient reports a stable cough denies any worsening shortness of air and denies any fever.  Recommend that she continue to [place]  [facility] [place]    Known Emergency Department medications received prior to my evaluation included 25 mg IV Benadryl, 25 mg oral Lopressor, 1 L normal saline bolus, Cardizem infusion. Room location at the time of my evaluation was 309A. Dicussed with admitting physician, Sena Martínez DO.  ---------------------------------------------------------------------------------------------------------------------   Review of Systems   Constitutional:  Negative for chills and fever.   HENT:  Negative for congestion, rhinorrhea, sinus pressure, sinus pain, sore throat and trouble swallowing.    Eyes:  Negative for visual disturbance.   Respiratory:  Positive for shortness of breath (\"all the time\"). Negative for cough and choking.    Cardiovascular:  Positive for palpitations. Negative for chest pain and leg swelling.   Gastrointestinal:  Negative for abdominal pain, diarrhea, nausea and vomiting.   Genitourinary:  Negative for difficulty urinating, dysuria, flank pain and hematuria.   Musculoskeletal:  Negative for gait problem.   Skin:  Negative for rash.   Neurological:  Positive for dizziness. Negative for tremors, seizures, syncope, facial asymmetry, speech difficulty and numbness.     ---------------------------------------------------------------------------------------------------------------------   Past Medical History:   Diagnosis Date    COPD (chronic obstructive pulmonary disease)     Osteoporosis      Past Surgical History:   Procedure Laterality " Date    APPENDECTOMY       SECTION      SHOULDER SURGERY       Family History   Problem Relation Age of Onset    Heart disease Father     COPD Father     Diabetes Father      Social History     Socioeconomic History    Marital status:    Tobacco Use    Smoking status: Every Day     Current packs/day: 1.00     Average packs/day: 1 pack/day for 50.0 years (50.0 ttl pk-yrs)     Types: Cigarettes    Smokeless tobacco: Never   Vaping Use    Vaping status: Never Used   Substance and Sexual Activity    Alcohol use: No    Drug use: No    Sexual activity: Defer     ---------------------------------------------------------------------------------------------------------------------   Allergies:  Other  ---------------------------------------------------------------------------------------------------------------------   Home medications:    Medications below are reported home medications pulling from within the system; at this time, these medications have not been reconciled unless otherwise specified and are in the verification process for further verifcation as current home medications.  Medications Prior to Admission   Medication Sig Dispense Refill Last Dose/Taking    albuterol (PROVENTIL) (2.5 MG/3ML) 0.083% nebulizer solution USE ONE VIAL IN NEBULIZER EVERY 4 HOURS AS NEEDED FOR  SHORTNESS OF AIR 30 vial 2     amLODIPine (NORVASC) 5 MG tablet Take 1 tablet by mouth Daily.       Budeson-Glycopyrrol-Formoterol (Breztri Aerosphere) 160-9-4.8 MCG/ACT aerosol inhaler Inhale 2 puffs 2 (Two) Times a Day. 10.7 g 11     escitalopram (LEXAPRO) 10 MG tablet Take 1 tablet by mouth Daily.       metoprolol tartrate (LOPRESSOR) 25 MG tablet Take 1 tablet by mouth 2 (Two) Times a Day. 180 tablet 1     nitroglycerin (NITROSTAT) 0.4 MG SL tablet Place 1 tablet under the tongue Every 5 (Five) Minutes As Needed for Chest Pain. Take no more than 3 doses in 15 minutes. 25 tablet 0     roflumilast (Daliresp) 500 MCG tablet  tablet Take 1 tablet by mouth Daily. 30 tablet 6     rosuvastatin (Crestor) 20 MG tablet Take 1 tablet by mouth Daily. (Patient not taking: Reported on 12/10/2024) 30 tablet 0     VENTOLIN  (90 BASE) MCG/ACT inhaler Inhale 2 puffs Every 4 (Four) Hours As Needed for shortness of air. 1 inhaler 2     vitamin D (ERGOCALCIFEROL) 1.25 MG (48627 UT) capsule capsule Take 1 capsule by mouth 1 (One) Time Per Week.          Hospital Scheduled Meds:  heparin (porcine), 5,000 Units, Subcutaneous, Q12H  metoprolol tartrate, 25 mg, Oral, Q12H  sodium chloride, 10 mL, Intravenous, Q12H      dilTIAZem, 5-15 mg/hr, Last Rate: 10 mg/hr (01/11/25 2102)        Current listed hospital scheduled medications may not yet reflect those currently placed in orders that are signed and held awaiting patient's arrival to floor.   ---------------------------------------------------------------------------------------------------------------------     Objective     Vital Signs:  Temp:  [98 °F (36.7 °C)-98.1 °F (36.7 °C)] 98.1 °F (36.7 °C)  Heart Rate:  [] 94  Resp:  [16-18] 18  BP: ()/(59-79) 128/73      01/11/25  1749 01/11/25 2207   Weight: 51.8 kg (114 lb 3.2 oz) 56.2 kg (123 lb 14.4 oz)     Body mass index is 20 kg/m².  ---------------------------------------------------------------------------------------------------------------------       Physical Exam  Vitals reviewed.   Constitutional:       General: She is awake. She is not in acute distress.     Appearance: She is not ill-appearing or diaphoretic.   HENT:      Head: Normocephalic and atraumatic.      Mouth/Throat:      Mouth: Mucous membranes are moist.      Pharynx: Oropharynx is clear.   Eyes:      Extraocular Movements: Extraocular movements intact.      Pupils: Pupils are equal, round, and reactive to light.   Cardiovascular:      Rate and Rhythm: Normal rate and regular rhythm.      Pulses: Normal pulses.      Heart sounds: Normal heart sounds. No murmur  heard.     No friction rub.   Pulmonary:      Effort: Pulmonary effort is normal. No accessory muscle usage, respiratory distress or retractions.      Breath sounds: Decreased breath sounds present. No wheezing, rhonchi or rales.   Abdominal:      General: Bowel sounds are normal. There is no distension.      Palpations: Abdomen is soft.      Tenderness: There is no abdominal tenderness. There is no guarding.   Musculoskeletal:      Cervical back: Neck supple. No rigidity.      Right lower leg: No edema.      Left lower leg: No edema.   Skin:     General: Skin is warm and dry.      Capillary Refill: Capillary refill takes 2 to 3 seconds.   Neurological:      Mental Status: She is alert and oriented to person, place, and time. Mental status is at baseline.      Cranial Nerves: No dysarthria or facial asymmetry.      Sensory: Sensation is intact.      Motor: No weakness or tremor.   Psychiatric:         Attention and Perception: Attention normal.         Mood and Affect: Mood normal.         Speech: Speech normal.         Behavior: Behavior normal. Behavior is cooperative.         Thought Content: Thought content normal.         Cognition and Memory: Cognition normal.         Judgment: Judgment normal.       ---------------------------------------------------------------------------------------------------------------------  EKG:      (Initial EKG obtained on 1/11/2025 at 17:40)    (2nd EKG obtained on 1/11/2025 at 22:40) - Converted to NSR      Telemetry:  Appearing normal sinus rhythm 90s during my exam.   I have personally looked at both the EKG and the telemetry strips.  ---------------------------------------------------------------------------------------------------------------------   Results from last 7 days   Lab Units 01/11/25  1745   CRP mg/dL <0.30   WBC 10*3/mm3 6.82   HEMOGLOBIN g/dL 13.3   HEMATOCRIT % 42.6   MCV fL 96.6   MCHC g/dL 31.2*   PLATELETS 10*3/mm3 325   INR  0.83*         Results from last  "7 days   Lab Units 01/11/25  1745   SODIUM mmol/L 142   POTASSIUM mmol/L 4.0   MAGNESIUM mg/dL 1.9   CHLORIDE mmol/L 101   CO2 mmol/L 32.7*   BUN mg/dL 9   CREATININE mg/dL 0.63   CALCIUM mg/dL 9.6   GLUCOSE mg/dL 100*   ALBUMIN g/dL 4.0   BILIRUBIN mg/dL <0.2   ALK PHOS U/L 96   AST (SGOT) U/L 15   ALT (SGPT) U/L 13   Estimated Creatinine Clearance: 74.8 mL/min (by C-G formula based on SCr of 0.63 mg/dL).  No results found for: \"AMMONIA\"  Results from last 7 days   Lab Units 01/11/25  1909 01/11/25  1745   HSTROP T ng/L 15* 12         Lab Results   Component Value Date    HGBA1C 5.40 08/17/2023     Lab Results   Component Value Date    TSH 1.640 01/11/2025    FREET4 1.06 01/11/2025     No results found for: \"PREGTESTUR\", \"PREGSERUM\", \"HCG\", \"HCGQUANT\"  Pain Management Panel           No data to display                  ---------------------------------------------------------------------------------------------------------------------  Imaging Results (Last 7 Days)       Procedure Component Value Units Date/Time    CT Angiogram Chest Pulmonary Embolism [[id number]] Collected: 01/11/25 2005     Updated: 01/11/25 2011    Narrative:      PROCEDURE: CT angiography of the chest performed with IV contrast on  January 11, 2025. The examination was performed with 2 mm axial imaging  and 2 mm sagittal and coronal reconstruction images. The patient was  administered 70 cc of Isovue-370 contrast IV without complication.     HISTORY: Shortness of breath.     COMPARISON: None.     FINDINGS:     Emphysema.  Pulmonary parenchymal scarring in the right upper lobe and left upper  lobe, right greater than left.  Pulmonary parenchymal scarring and areas of cavitation in the superior  segment of the right lower lobe with spiculated margins. Contribution  from underlying neoplastic process is not excluded.  No features to suggest abscess or superinfection of the areas of  cavitation.  Bronchial wall thickening noted in the right upper " lobe and left upper  lobe and superior segment of the right lower lobe consistent with  bronchial inflammation.  No pleural effusion.  No pneumothorax.  Normal heart size with no pericardial effusion.  No thoracic aortic aneurysm or dissection.  No pulmonary embolus.  Normal thyroid gland.  No free fluid or free air in the upper abdomen.  Small subcarinal lymph nodes and small bilateral hilar nodes.       Impression:         Emphysema.  Bronchial inflammation.  Small areas of cavitation in the superior segment of the right lower  lobe could represent underlying chronic infectious process or underlying  neoplastic process with adjacent pulmonary parenchymal scarring and/or  spiculation from underlying neoplastic lesion.  No thoracic aortic aneurysm or dissection.   No pulmonary embolus  No pericardial effusion.  Small mediastinal and small bilateral hilar nodes.  Right lower lobe calcified granuloma.  No acute process seen in the upper abdomen.        This report was finalized on 1/11/2025 8:09 PM by Channing Cox MD.       XR Chest 1 View [[id number]] Collected: 01/11/25 1903     Updated: 01/11/25 1906    Narrative:      EXAM:    XR Chest, 1 View     EXAM DATE:    1/11/2025 6:01 PM     CLINICAL HISTORY:    SOB     TECHNIQUE:    Frontal view of the chest.     COMPARISON:    8/28/2024     FINDINGS:    Lungs and pleural spaces:  Linear opacities of the right upper lobe  may represent atelectasis and/or pneumonia.  Increased bronchial  markings may represent acute and/or chronic bronchitis.  Emphysema with  hyperinflation, stable.  No pneumothorax.  No consolidative airspace  disease.    Heart:  Unremarkable.  No cardiomegaly.    Mediastinum:  Unremarkable.  Normal mediastinal contour.    Bones/joints:  Unremarkable.  No acute fracture.       Impression:      1.  No consolidative airspace disease.  2.  Linear opacities of the right upper lobe may represent atelectasis  and/or pneumonia.  3.  Increased bronchial markings  may represent acute and/or chronic  bronchitis.  4.  Emphysema with hyperinflation, stable.     This report was finalized on 1/11/2025 7:04 PM by Dr. Jules Reed MD.               Last echocardiogram: No previous echo on file.    I have personally reviewed the above radiology images and read the final radiology report on 01/11/25  ---------------------------------------------------------------------------------------------------------------------  Assessment / Plan     Active Hospital Problems    Diagnosis  POA    **A-fib [I48.91]  Yes       ASSESSMENT/PLAN:  #History of paroxysmal atrial fibrillation with RVR on admission  #Mild troponin elevation (POA, suspect type II - due to above)  #History of non-sustained V-tach  #COPD without acute exacerbation  #Chronic respiratory failure on 2L NC continuously at baseline  #Former tobacco abuse  #Essential hypertension  #Hyperlipidemia  #Osteoporosis  --EKG obtained on arrival noted Afib RVR 150s (per VS charted, rate was up to 170s).   --Placed on Cardizem gtt in the ED (on 10 mg/hr at the time patient accepted for admission).   --Placed on beta blocker; metoprolol tartrate 25 mg BID (patient given dose tonight and shortly after converted from Afib to normal sinus rhythm with rate in the 90s).   --Plan to discontinue Cardizem infusion.  --Check magnesium.  --Monitor and replace electrolytes per protocol.  --TSH and free T4 WNL.   --Review rate/rhythm controlling agents once medications are reconciled by pharmacy.   --Consult pharmacy to price Eliquis as patient's [id number] approximately 3 (age, gender, HTN).   --Subq heparin Q 12 hours for now.   --HS troponin T 12 with +3  delta. EKG positive for some nonspecific changes. No acute ST elevations or depressions.   --Obtain serial EKGs to monitor for changes.   --Continuous cardiac monitoring ordered.   --Obtain transthoracic echocardiogram.   --Stress test from June 2024 noted normal myocardial perfusion, normal LV  EF, and normal ECG stress.  --Review home COPD controlling agents/plan to continue.   --Stable on baseline O2 requirement at this time.  --Patient states that she attends the COPD clinic; encourage ongoing follow up.   --Continuous pulse oximetry ordered.   --PRN Atrovent nebs available for SOA.  --Encourage turn, cough, and deep breathing exercises.   --Patient states she is at baseline functional status at this time therefore no PT/OT consults.   --Repeat labs in the AM.         ----------  -DVT prophylaxis: Subq Heparin.   -Activity: As tolerated.   -Expected length of stay:   OBSERVATION status; however, if further evaluation or treatment plans warrant, status may change.  Based upon current information, I predict patient's care encounter to be less than or equal to 2 midnights   -Disposition plans to return home on discharge once medically stable.     High risk secondary to Afib with RVR.     CODE STATUS: DNR/DNI, discussed with patient at bedside.       Marlene Levi, APRN   01/11/25  23:35 EST  Pager #[phone]  ---------------------------------------------------------------------------------------------------------------------

## 2025-01-12 NOTE — NURSING NOTE
Patient to be discharged home. IV and telemetry removed. Patient to be transported home via family vehicle. To be on 2L oxygen at discharge, which is patient baseline.

## 2025-01-12 NOTE — DISCHARGE SUMMARY
[facility] HOSPITALISTS DISCHARGE SUMMARY    Patient Identification:  Name:  Federica Rahman  Age:  69 y.o.  Sex:  female  :  1955  MRN:  [id number]  Visit Number:  [id number]    Date of Admission: 2025  Date of Discharge:  2025     PCP: Bhupendra Lara APRN    DISCHARGE DIAGNOSIS  Paroxysmal atrial fibrillation with RVR, POA  Elevated troponin secondary to ventricular rate  History nonsustained V. tach  COPD, not in exacerbation  Chronic hypoxemic respiratory failure on 2 L  Former smoking tobacco abuse  Hypertension  Hyperlipidemia  Osteoporosis    CONSULTS   None    PROCEDURES PERFORMED      HOSPITAL COURSE  Patient is a 69 y.o. female presented to [facility] complaining of palpitations, concerned she was in atrial fibrillation.  Please see the admitting history and physical for further details.      Patient upon initial presentation evaluation emergency department noted to be in atrial fibrillation with rapid ventricular rate and immediately initiated on IV Cardizem drip with no attempts at oral or IV beta-blockade and subsequently called the hospitalist service for admission.  Due to patient already being placed on titratable drip in the setting of atrial fibrillation with RVR is admitted to the hospitalist service for observation and further evaluation.  Patient reported having taken her morning dose of metoprolol but was unable to take her evening dose while in the ER and was not ordered by ER provider and subsequently resumed upon admission.  Shortly after arriving reporting with resumption of her home oral metoprolol patient with spontaneous conversion to normal sinus rhythm with confirmation on repeat EKG and Cardizem subsequently discontinued.  It was noted during patient's admission that he was not chronically anticoagulated and patient was unsure why she was not years however upon further review of patient's chart and going to outpatient records it is  noted that patient with previous one-time episode of paroxysmal atrial fibrillation that was felt to be secondary to pericarditis when she lived in [place] with all subsequent workup never finding any abnormalities and previous Holter monitors revealing no A-fib.  However given patient's presentation with atrial fibrillation she is currently started on anticoagulation and transition to Eliquis at time of discharge after discussion of cough as patient was to have extensive co-pay however wanted to do 30-day trial and follow-up with management and cardiology office to see if arrangements could be made and if multiple agreeable to having to go on warfarin therapy if cost remains an issue.  The following morning after spontaneous conversion patient undergoing updated transthoracic echocardiogram which revealed an EF of 65% with normal RV size with no evidence of PFO or atrial septal defect.  No significant aortic stenosis or regurgitation was noted although not clearly visualized.  There was notable trace mitral regurg and trace tricuspid regurg with trace pericardial effusion.  Otherwise no abnormalities.  Given patient with recent stress test in June 2024 that was completely normal and patient remained chest pain-free and normal sinus rhythm she is felt to achieve maximal benefit of continued inpatient observation subsequently discharged home in stable medical condition.  Patient will follow-up with her PCP and her primary cardiologist Dr. Aldana postdischarge.    VITAL SIGNS:  Temp:  [97.9 °F (36.6 °C)-98.5 °F (36.9 °C)] 97.9 °F (36.6 °C)  Heart Rate:  [] 75  Resp:  [16-18] 18  BP: ()/(59-79) 102/60  SpO2:  [94 %-100 %] 98 %  on  Flow (L/min) (Oxygen Therapy):  [2] 2;   Device (Oxygen Therapy): nasal cannula    Body mass index is 20 kg/m².  Wt Readings from Last 3 Encounters:   01/12/25 56.2 kg (123 lb 14.4 oz)   12/10/24 51.8 kg (114 lb 3.2 oz)   12/03/24 52.1 kg (114 lb 12.8 oz)       PHYSICAL  EXAM:  Constitutional:  Well-developed and well-nourished.  No respiratory distress.      HENT:  Head:  Normocephalic and atraumatic.  Mouth:  Moist mucous membranes.    Eyes:  Conjunctivae and EOM are normal.  No scleral icterus.    Neck:  Neck supple.  No JVD present.    Cardiovascular:  Normal rate, regular rhythm and normal heart sounds with no murmur.  Pulmonary/Chest:  No respiratory distress, no wheezes, no crackles, with decreased breath sounds and air movement.  Abdominal:  Soft.  Bowel sounds are normal.  No distension and no tenderness.   Musculoskeletal:  No edema, no tenderness, and no deformity.  No red or swollen joints anywhere.    Neurological:  Alert and oriented to person, place, and time.  No cranial nerve deficit.  No tongue deviation.  No facial droop.  No slurred speech.   Skin:  Skin is warm and dry. No rash noted. No pallor.   Peripheral vascular: Pulses in all 4 extremities with no clubbing, no cyanosis, no edema.    DISCHARGE DISPOSITION   Stable    DISCHARGE MEDICATIONS:     Discharge Medications        New Medications        Instructions Start Date   apixaban 5 MG tablet tablet  Commonly known as: ELIQUIS   5 mg, Oral, 2 Times Daily             Continue These Medications        Instructions Start Date   amLODIPine 5 MG tablet  Commonly known as: NORVASC   5 mg, Daily      escitalopram 10 MG tablet  Commonly known as: LEXAPRO   10 mg, Oral, Daily      metoprolol tartrate 25 MG tablet  Commonly known as: LOPRESSOR   25 mg, Oral, 2 Times Daily      roflumilast 500 MCG tablet tablet  Commonly known as: Daliresp   500 mcg, Oral, Daily      rosuvastatin 20 MG tablet  Commonly known as: CRESTOR   20 mg, Daily      vitamin D 1.25 MG (90422 UT) capsule capsule  Commonly known as: ERGOCALCIFEROL   50,000 Units, Oral, Weekly                 Additional Instructions for the Follow-ups that You Need to Schedule       Discharge Follow-up with PCP   As directed       Currently Documented PCP:    Jane  ANGELITO Lanza    PCP Phone Number:    [phone]     Follow Up Details: 1 week post hospital follow up        Discharge Follow-up with Specialty: Cardiology; 2 Weeks   As directed      Specialty: Cardiology   Follow Up: 2 Weeks   Follow Up Details: 2 week follow up with Dr. Aldana, Cardiology for afib with newly started DOAC               Follow-up Information       Bhupendra Lara, ANGELITO .    Specialty: Nurse Practitioner  Why: 1 week post hospital follow up  Contact information:   [address]  [place] [id number]  [phone]                              TEST  RESULTS PENDING AT DISCHARGE       CODE STATUS  Code Status and Medical Interventions: No CPR (Do Not Attempt to Resuscitate); Limited Support; No intubation (DNI)   Ordered at: 01/12/25 0750     Medical Intervention Limits:    No intubation (DNI)     Code Status (Patient has no pulse and is not breathing):    No CPR (Do Not Attempt to Resuscitate)     Medical Interventions (Patient has pulse or is breathing):    Limited Support       Greg Langley DO  [facility]  01/12/25  17:17 EST    Please note that this discharge summary required more than 30 minutes to complete.

## 2025-01-12 NOTE — PHARMACY PATIENT ASSISTANCE
Pharmacy was asked to look into eliquis coverage/cost.  According to her insurance, she will have a $145 co-insurance. Insurance paid $437.  With the new medicare and no coverage gap I am not entirely sure how the changes work now.  We would need to call the insurance and ask if the co-insurance stays the same for every fill or when she meets a certain amount will it decrease.  I have discussed with Dr. Langley and she was going to talk to the patient and let pharmacy know our next steps.  We can also provide a free 30 day trial card.    Thank You;  Abbie Serrano, PharmD  01/12/25  16:44 EST

## 2025-01-13 ENCOUNTER — TELEPHONE (OUTPATIENT)
Dept: TELEMETRY | Facility: HOSPITAL | Age: 70
End: 2025-01-13
Payer: MEDICARE

## 2025-01-13 NOTE — CASE MANAGEMENT/SOCIAL WORK
Discharge Planning Assessment   [place]     Patient Name: Federica Rahman  MRN: [id number]  Today's Date: 1/13/2025    Admit Date: 1/11/2025          Discharge Plan       Row Name 01/13/25 1713       Plan    Final Discharge Disposition Code 01 - home or self-care    Final Note SS received a consult on the ED SW Consult list. Pt was discharged home on 1/12/25. No SS needs identified at this time.                   Expected Discharge Date and Time       Expected Discharge Date Expected Discharge Time    Jan 12, 2025      NAZ White

## 2025-01-28 ENCOUNTER — HOSPITAL ENCOUNTER (OUTPATIENT)
Dept: CT IMAGING | Facility: HOSPITAL | Age: 70
Discharge: HOME | End: 2025-01-28
Admitting: PHYSICIAN ASSISTANT
Payer: MEDICARE

## 2025-01-28 DIAGNOSIS — F17.210 CIGARETTE NICOTINE DEPENDENCE WITHOUT COMPLICATION: ICD-10-CM

## 2025-01-28 DIAGNOSIS — J44.9 STAGE 4 VERY SEVERE COPD BY GOLD CLASSIFICATION: ICD-10-CM

## 2025-01-28 DIAGNOSIS — R91.8 MULTIPLE PULMONARY NODULES: ICD-10-CM

## 2025-01-28 DIAGNOSIS — J98.4 CAVITARY LESION OF LUNG: ICD-10-CM

## 2025-01-28 PROCEDURE — 71271 CT THORAX LUNG CANCER SCR C-: CPT

## 2025-01-30 ENCOUNTER — OFFICE VISIT (OUTPATIENT)
Dept: CARDIOLOGY | Facility: CLINIC | Age: 70
End: 2025-01-30
Payer: MEDICARE

## 2025-01-30 VITALS
SYSTOLIC BLOOD PRESSURE: 102 MMHG | HEART RATE: 85 BPM | DIASTOLIC BLOOD PRESSURE: 62 MMHG | OXYGEN SATURATION: 97 % | BODY MASS INDEX: 18.48 KG/M2 | WEIGHT: 115 LBS | HEIGHT: 66 IN

## 2025-01-30 DIAGNOSIS — E78.2 MIXED HYPERLIPIDEMIA: ICD-10-CM

## 2025-01-30 DIAGNOSIS — F17.210 CIGARETTE SMOKER: ICD-10-CM

## 2025-01-30 DIAGNOSIS — I48.0 PAROXYSMAL ATRIAL FIBRILLATION: Primary | ICD-10-CM

## 2025-01-30 PROCEDURE — 3078F DIAST BP <80 MM HG: CPT | Performed by: INTERNAL MEDICINE

## 2025-01-30 PROCEDURE — 99214 OFFICE O/P EST MOD 30 MIN: CPT | Performed by: INTERNAL MEDICINE

## 2025-01-30 PROCEDURE — 3074F SYST BP LT 130 MM HG: CPT | Performed by: INTERNAL MEDICINE

## 2025-01-30 NOTE — LETTER
February 2, 2025     ANGELITO Reyes  [address]  [address]  [place] [address]    Patient: Federica Rahman   YOB: 1955   Date of Visit: 1/30/2025       Dear ANGELITO Reyes    Federica Rahman was in my office today. Below is a copy of my note.    If you have questions, please do not hesitate to call me. I look forward to following Federica along with you.         Sincerely,        Janel Aldana MD        CC: No Recipients    No notes on file

## 2025-02-02 PROCEDURE — 93000 ELECTROCARDIOGRAM COMPLETE: CPT | Performed by: INTERNAL MEDICINE

## 2025-02-02 NOTE — PROGRESS NOTES
subjective     Chief Complaint   Patient presents with    Hospital Follow Up Visit     PT FEELING A LOT BETTER    Atrial Fibrillation     FOLLOW       Problems Addressed This Visit  1. Paroxysmal atrial fibrillation    2. Mixed hyperlipidemia    3. Cigarette smoker        History of Present Illness    Federica is a 69 years old white female who has severe COPD and chronic hypoxemic respiratory failure.  She recently was admitted to the hospital with respiratory failure and was developed recurrence of atrial fibrillation she was started on Eliquis 5 mg twice daily  [id number] 2.  Gender and age.    She is maintaining sinus rhythm at this time without any antiarrhythmic medications.  She is taking Lopressor 25 mg twice daily along with Eliquis.    She denies any chest pain or palpitations.  She also has hyperlipidemia and is on Crestor 20 mg daily.  No drug side effects.    Unfortunately patient continues to smoke.    She states that she is not taking Norvasc because her blood pressure runs very low with around 102/62 today.      Past Surgical History:   Procedure Laterality Date    APPENDECTOMY       SECTION      SHOULDER SURGERY       Family History   Problem Relation Age of Onset    Heart disease Father     COPD Father     Diabetes Father      Past Medical History:   Diagnosis Date    COPD (chronic obstructive pulmonary disease)     Osteoporosis      Patient Active Problem List   Diagnosis    Chronic obstructive pulmonary disease    Hypoxia    Sleep apnea    Shortness of breath    Pneumonia due to Mycoplasma pneumoniae    Allergic rhinitis    Hypersomnia    Cough    Osteoporosis    Essential hypertension    Paroxysmal atrial fibrillation    Idiopathic pericarditis, resolved    Chest pain, atypical, resolved    Cigarette smoker    Nonsustained ventricular tachycardia    Mixed hyperlipidemia    A-fib       Social History     Tobacco Use    Smoking status: Every Day     Current packs/day: 1.00     Average  packs/day: 1 pack/day for 50.0 years (50.0 ttl pk-yrs)     Types: Cigarettes    Smokeless tobacco: Never   Vaping Use    Vaping status: Never Used   Substance Use Topics    Alcohol use: No    Drug use: No       Allergies   Allergen Reactions    Other        Current Outpatient Medications on File Prior to Visit   Medication Sig    apixaban (ELIQUIS) 5 MG tablet tablet Take 1 tablet by mouth 2 (Two) Times a Day for 30 days.    escitalopram (LEXAPRO) 10 MG tablet Take 1 tablet by mouth Daily.    metoprolol tartrate (LOPRESSOR) 25 MG tablet Take 1 tablet by mouth 2 (Two) Times a Day.    roflumilast (Daliresp) 500 MCG tablet tablet Take 1 tablet by mouth Daily.    rosuvastatin (CRESTOR) 20 MG tablet Take 1 tablet by mouth Daily.    vitamin D (ERGOCALCIFEROL) 1.25 MG (33768 UT) capsule capsule Take 1 capsule by mouth 1 (One) Time Per Week.     No current facility-administered medications on file prior to visit.     (Not in a hospital admission)      Results for orders placed during the hospital encounter of 01/11/25    Adult Transthoracic Echo Complete W/ Cont if Necessary Per Protocol    Interpretation Summary    Left ventricular small in size with estimated ejection fraction of 60 to 65%. Normal diastolic function.    Right ventricle is normal in size and function.    No evidence of a patent foramen ovale. No evidence of an atrial septal defect present. Left atrium is mildly enlarged.    The inferior vena cava is normally sized. Right atrium appears normal in size.    The aortic valve is not clearly visualized. There is no significant aortic stenosis or regurgitation.    There is trace mitral regurgitation.    Insufficient TR velocity profile to estimate the right ventricular systolic pressure. There is trace tricuspid regurgitation.    Trace pericardial effusion.    Results for orders placed during the hospital encounter of 06/03/24    Stress test with myocardial perfusion one day    Interpretation Summary     "Myocardial perfusion imaging indicates a normal myocardial perfusion study with no evidence of ischemia.    Left ventricular ejection fraction is normal.    TID 1.14.    Findings consistent with a normal ECG stress test.          The following portions of the patient's history were reviewed and updated as appropriate: allergies, current medications, past family history, past medical history, past social history, past surgical history and problem list.    Review of Systems   Constitutional: Negative.   HENT: Negative.  Negative for congestion.    Eyes: Negative.    Cardiovascular: Negative.  Positive for dyspnea on exertion. Negative for chest pain, cyanosis, irregular heartbeat, leg swelling, near-syncope, orthopnea, palpitations, paroxysmal nocturnal dyspnea and syncope.   Respiratory: Negative.  Positive for shortness of breath.    Hematologic/Lymphatic: Negative.    Musculoskeletal: Negative.    Gastrointestinal: Negative.    Neurological: Negative.  Negative for headaches.          Objective:     /62 (BP Location: Left arm, Patient Position: Sitting, Cuff Size: Adult)   Pulse 85   Ht 167.6 cm (66\")   Wt 52.2 kg (115 lb)   SpO2 97% Comment: ON 2 lpm OXYGEN  BMI 18.56 kg/m²   Pulmonary:      Breath sounds: Wheezing present.   Cardiovascular:      PMI at left midclavicular line. Normal rate. Regular rhythm. Normal S1. Normal S2.       Murmurs: There is no murmur.      No gallop.  No click. No rub.   Pulses:     Intact distal pulses.   Edema:     Peripheral edema absent.           Lab Review  Lab Results   Component Value Date     01/11/2025    K 4.0 01/11/2025     01/11/2025    BUN 9 01/11/2025    CREATININE 0.63 01/11/2025    GLUCOSE 100 (H) 01/11/2025    CALCIUM 9.6 01/11/2025    ALT 13 01/11/2025    ALKPHOS 96 01/11/2025    LABIL2 1.7 04/29/2016     Lab Results   Component Value Date    CKTOTAL 92 04/24/2016     Lab Results   Component Value Date    WBC 6.82 01/11/2025    HGB 13.3 " 01/11/2025    HCT 42.6 01/11/2025     01/11/2025     Lab Results   Component Value Date    INR 0.83 (L) 01/11/2025     Lab Results   Component Value Date    MG 1.9 01/11/2025     Lab Results   Component Value Date    TSH 1.640 01/11/2025     Lab Results   Component Value Date    BNP 19 04/21/2016     Lab Results   Component Value Date    CHOL 204 (H) 08/17/2023    TRIG 81 08/17/2023    HDL 91 (H) 08/17/2023    VLDL 14 08/17/2023    LDL 99 08/17/2023     Lab Results   Component Value Date    LDL 99 08/17/2023     Lab Results   Component Value Date    PROBNP 98.7 08/28/2024                 ECG 12 Lead    Date/Time: 2/2/2025 9:39 AM  Performed by: Janel Aldana MD    Authorized by: Janel Aldana MD  Comparison: compared with previous ECG from 1/11/2025  Similar to previous ECG  Rhythm: sinus rhythm  Rate: normal  BPM: 79  Conduction: conduction normal  ST Segments: ST segments normal  T Waves: T waves normal  QRS axis: normal  Other findings: bilateral atrial abnormality    Clinical impression: abnormal EKG             I personally viewed and interpreted the patient's LAB data         Assessment:     1. Paroxysmal atrial fibrillation    2. Mixed hyperlipidemia    3. Cigarette smoker          Plan:     1 day 69 years old white female who has history of paroxysmal atrial fibrillation and history of nonsustained ventricular tachycardia.  She was recently in the hospital with respiratory failure and COPD exacerbation.  Patient developed recurrence of atrial fibrillation now she is back into sinus rhythm.  She is not requiring any antiarrhythmic medications  [id number] score is 2 she is taking Eliquis 5 twice daily which was continued.  No drug side effects    Lopressor 25 twice daily was also continued.    Despite having severe COPD chronic respiratory failure he has 50-pack-year history of smoking and continues to smoke.  Cessation of tobacco use was stressed.    Follow-up scheduled      No  follow-ups on file.

## 2025-02-05 ENCOUNTER — TELEPHONE (OUTPATIENT)
Dept: CARDIOLOGY | Facility: CLINIC | Age: 70
End: 2025-02-05
Payer: MEDICARE

## 2025-02-19 ENCOUNTER — TELEPHONE (OUTPATIENT)
Dept: PULMONOLOGY | Facility: CLINIC | Age: 70
End: 2025-02-19
Payer: MEDICARE

## 2025-02-19 DIAGNOSIS — R91.8 MULTIPLE PULMONARY NODULES: ICD-10-CM

## 2025-02-19 DIAGNOSIS — F17.210 CIGARETTE NICOTINE DEPENDENCE WITHOUT COMPLICATION: ICD-10-CM

## 2025-02-19 DIAGNOSIS — R91.1 PULMONARY NODULE: Primary | ICD-10-CM

## 2025-02-19 NOTE — TELEPHONE ENCOUNTER
Attempted to call with CT scan results - reviewed with Dr. Underwood.   Left a brief message requesting call back    ----------------------------------------------------------------------------    CT chest January 2025  left upper lobe pulmonary nodule that was 6.8 mm in October 2023, had a gap in follow up imaging.   Increased, now 10 mm (Jan 2025).   Biapical scarring (stable since 2023)  Image 124 - 6.4 mm irregular opacity (stable since 2023)  New few 2-3 mm left peripheral nodules   Right posterior cavitation (stable since 2015) emphysema changes are stable.   Image 326 - 6.6 mm right calcified nodule (stable since 2023)  Stable calcified lymph nodes      Due to high risk for complication in the setting of severe COPD (emphysema), and CHRF, Dr. Underwood recommended consideration of PET CT.   If significant, then recommend referring to radiation oncology for possible SBRT.

## 2025-02-21 NOTE — TELEPHONE ENCOUNTER
Updated patient with CT scan results today.     Discussed findings detailed below.     Most concern at this time is of the MARY JO 10 mm nodule.   Due to high risk for pulmonary procedures due to severe underlying emphysema, Dr. Underwood recommended PET scan. If positive, may be able to consider SBRT.       She would like to further investigate the nodule with the PET scan.  Order placed for PET scan.   Will consider referral to radiation oncology for possible SBRT if positive.   Has upcoming COPD clinic appointment on 3/5.   Can still follow up on other findings with follow up imaging at a later date.

## 2025-03-05 ENCOUNTER — HOSPITAL ENCOUNTER (OUTPATIENT)
Dept: PULMONOLOGY | Facility: HOSPITAL | Age: 70
Discharge: HOME | End: 2025-03-05
Payer: MEDICARE

## 2025-03-05 ENCOUNTER — HOSPITAL ENCOUNTER (OUTPATIENT)
Dept: PET IMAGING | Facility: HOSPITAL | Age: 70
Discharge: HOME | End: 2025-03-05
Payer: MEDICARE

## 2025-03-05 VITALS
SYSTOLIC BLOOD PRESSURE: 104 MMHG | BODY MASS INDEX: 18.4 KG/M2 | OXYGEN SATURATION: 95 % | HEART RATE: 89 BPM | WEIGHT: 114 LBS | DIASTOLIC BLOOD PRESSURE: 59 MMHG

## 2025-03-05 DIAGNOSIS — R91.8 MULTIPLE PULMONARY NODULES: ICD-10-CM

## 2025-03-05 DIAGNOSIS — R91.1 PULMONARY NODULE: ICD-10-CM

## 2025-03-05 DIAGNOSIS — F17.210 CIGARETTE NICOTINE DEPENDENCE WITHOUT COMPLICATION: ICD-10-CM

## 2025-03-05 DIAGNOSIS — J96.12 CHRONIC RESPIRATORY FAILURE WITH HYPOXIA AND HYPERCAPNIA: ICD-10-CM

## 2025-03-05 DIAGNOSIS — J96.11 CHRONIC RESPIRATORY FAILURE WITH HYPOXIA AND HYPERCAPNIA: ICD-10-CM

## 2025-03-05 DIAGNOSIS — J44.9 STAGE 4 VERY SEVERE COPD BY GOLD CLASSIFICATION: Primary | ICD-10-CM

## 2025-03-05 PROCEDURE — G0463 HOSPITAL OUTPT CLINIC VISIT: HCPCS | Performed by: PHYSICIAN ASSISTANT

## 2025-03-05 PROCEDURE — 34310000005 FLUDEOXYGLUCOSE F18 SOLUTION: Performed by: PHYSICIAN ASSISTANT

## 2025-03-05 PROCEDURE — A9552 F18 FDG: HCPCS | Performed by: PHYSICIAN ASSISTANT

## 2025-03-05 PROCEDURE — 78815 PET IMAGE W/CT SKULL-THIGH: CPT

## 2025-03-05 RX ORDER — NITROGLYCERIN 0.4 MG/1
0.4 TABLET SUBLINGUAL
COMMUNITY

## 2025-03-05 RX ORDER — APIXABAN 5 MG/1
5 TABLET, FILM COATED ORAL 2 TIMES DAILY
Qty: 60 TABLET | Refills: 0 | Status: SHIPPED | OUTPATIENT
Start: 2025-03-05

## 2025-03-05 RX ORDER — ALBUTEROL SULFATE 90 UG/1
2 INHALANT RESPIRATORY (INHALATION) EVERY 4 HOURS PRN
COMMUNITY

## 2025-03-05 RX ORDER — BUDESONIDE, GLYCOPYRROLATE, AND FORMOTEROL FUMARATE 160; 9; 4.8 UG/1; UG/1; UG/1
2 AEROSOL, METERED RESPIRATORY (INHALATION) 2 TIMES DAILY
COMMUNITY

## 2025-03-05 RX ORDER — ALBUTEROL SULFATE 0.83 MG/ML
2.5 SOLUTION RESPIRATORY (INHALATION) EVERY 4 HOURS PRN
COMMUNITY

## 2025-03-05 RX ADMIN — FLUDEOXYGLUCOSE F 18 1 DOSE: 200 INJECTION, SOLUTION INTRAVENOUS at 10:27

## 2025-03-05 NOTE — PROGRESS NOTES
Subjective      Chief Complaint  COPD    Subjective      History of Present Illness  Federica Rahman is a 69 y.o. female who presents today to [facility] with past medical history of paroxysmal atrial fibrillation, essential hypertension, hyperlipidemia, history of nonsustained V. tach, osteoporosis, COPD, pulmonary nodule of left lung, chronic hypoxemic respiratory failure, and tobacco abuse who presents today for COPD. This visit is a follow up appointment.     COPD:  Patient has had hospitalization since her initial visit.  She was admitted from January 11, 2025 to January 12, 2025 due to paroxysmal atrial fibrillation with RVR.    Patient presents today for her follow-up appointment.  She had a PET scan performed today prior to her visit.  She reports that her breathing is actually doing better than her previous visit.  She was switched to Breztri and reports that she has a lot more benefit from it.  She has been utilizing her rescue inhalers as much as she did not feel like she needed them.  She was previously increased to higher dose of Daliresp and doing well on that.  She denies experiencing any side effects.  She does report that she has intermittent diarrhea but reports that this is likely due to her diet being high in fiber.  She is compliant with using her supplemental oxygen continuously with baseline of 2 L.    Current Outpatient Medications:     albuterol (PROVENTIL) (2.5 MG/3ML) 0.083% nebulizer solution, Take 2.5 mg by nebulization Every 4 (Four) Hours As Needed for Wheezing., Disp: , Rfl:     albuterol sulfate  (90 Base) MCG/ACT inhaler, Inhale 2 puffs Every 4 (Four) Hours As Needed for Wheezing., Disp: , Rfl:     Budeson-Glycopyrrol-Formoterol (Breztri Aerosphere) 160-9-4.8 MCG/ACT aerosol inhaler, Inhale 2 puffs 2 (Two) Times a Day., Disp: , Rfl:     Eliquis 5 MG tablet tablet, Take 1 tablet by mouth twice daily, Disp: 60 tablet, Rfl: 0    escitalopram  "(LEXAPRO) 10 MG tablet, Take 1 tablet by mouth Daily., Disp: , Rfl:     metoprolol tartrate (LOPRESSOR) 25 MG tablet, Take 1 tablet by mouth 2 (Two) Times a Day., Disp: 180 tablet, Rfl: 1    nitroglycerin (NITROSTAT) 0.4 MG SL tablet, Place 1 tablet under the tongue Every 5 (Five) Minutes As Needed for Chest Pain. Take no more than 3 doses in 15 minutes., Disp: , Rfl:     roflumilast (Daliresp) 500 MCG tablet tablet, Take 1 tablet by mouth Daily., Disp: 30 tablet, Rfl: 6    vitamin D (ERGOCALCIFEROL) 1.25 MG (27125 UT) capsule capsule, Take 1 capsule by mouth 1 (One) Time Per Week., Disp: , Rfl:     rosuvastatin (CRESTOR) 20 MG tablet, Take 1 tablet by mouth Daily. (Patient not taking: Reported on 3/5/2025), Disp: , Rfl:   No current facility-administered medications for this encounter.      Allergies   Allergen Reactions    Other        Objective     Objective   Vital Signs:  /59   Pulse 89   Wt 51.7 kg (114 lb)   SpO2 95%   BMI 18.40 kg/m²   Estimated body mass index is 18.4 kg/m² as calculated from the following:    Height as of 25: 167.6 cm (66\").    Weight as of this encounter: 51.7 kg (114 lb).    Past Medical History:   Diagnosis Date    COPD (chronic obstructive pulmonary disease)     Osteoporosis      Past Surgical History:   Procedure Laterality Date    APPENDECTOMY       SECTION      SHOULDER SURGERY       Social History     Socioeconomic History    Marital status:    Tobacco Use    Smoking status: Every Day     Current packs/day: 1.00     Average packs/day: 1 pack/day for 50.0 years (50.0 ttl pk-yrs)     Types: Cigarettes    Smokeless tobacco: Never   Vaping Use    Vaping status: Never Used   Substance and Sexual Activity    Alcohol use: No    Drug use: No    Sexual activity: Defer        Physical Exam  Constitutional:       General: She is awake.      Appearance: Normal appearance. She is underweight.      Interventions: Nasal cannula in place.   HENT:      Head: " "Normocephalic and atraumatic.      Nose: Nose normal.      Mouth/Throat:      Mouth: Mucous membranes are moist.      Pharynx: Oropharynx is clear.   Eyes:      Conjunctiva/sclera: Conjunctivae normal.      Pupils: Pupils are equal, round, and reactive to light.   Cardiovascular:      Rate and Rhythm: Normal rate and regular rhythm.      Pulses: Normal pulses.      Heart sounds: Normal heart sounds. No murmur heard.     No friction rub. No gallop.   Pulmonary:      Effort: Pulmonary effort is normal. No tachypnea, accessory muscle usage or respiratory distress.      Breath sounds: Decreased breath sounds present. No wheezing, rhonchi or rales.   Musculoskeletal:         General: Normal range of motion.      Cervical back: Full passive range of motion without pain and normal range of motion.   Skin:     General: Skin is warm and dry.   Neurological:      General: No focal deficit present.      Mental Status: She is alert. Mental status is at baseline.   Psychiatric:         Mood and Affect: Mood normal.         Behavior: Behavior normal. Behavior is cooperative.         Thought Content: Thought content normal.          Result Review :  The following labs and radiology results have been reviewed.    Lab Review:   No results found for: \"FEV1\", \"FVC\", \"WKC4PXT\", \"TLC\", \"DLCO\"  Admission on 01/11/2025, Discharged on 01/12/2025   Component Date Value Ref Range Status    Glucose 01/11/2025 100 (H)  65 - 99 mg/dL Final    BUN 01/11/2025 9  8 - 23 mg/dL Final    Creatinine 01/11/2025 0.63  0.57 - 1.00 mg/dL Final    Sodium 01/11/2025 142  136 - 145 mmol/L Final    Potassium 01/11/2025 4.0  3.5 - 5.2 mmol/L Final    Chloride 01/11/2025 101  98 - 107 mmol/L Final    CO2 01/11/2025 32.7 (H)  22.0 - 29.0 mmol/L Final    Calcium 01/11/2025 9.6  8.6 - 10.5 mg/dL Final    Total Protein 01/11/2025 6.7  6.0 - 8.5 g/dL Final    Albumin 01/11/2025 4.0  3.5 - 5.2 g/dL Final    ALT (SGPT) 01/11/2025 13  1 - 33 U/L Final    AST (SGOT) " 01/11/2025 15  1 - 32 U/L Final    Alkaline Phosphatase 01/11/2025 96  39 - 117 U/L Final    Total Bilirubin 01/11/2025 <0.2  0.0 - 1.2 mg/dL Final    Globulin 01/11/2025 2.7  gm/dL Final    A/G Ratio 01/11/2025 1.5  g/dL Final    BUN/Creatinine Ratio 01/11/2025 14.3  7.0 - 25.0 Final    Anion Gap 01/11/2025 8.3  5.0 - 15.0 mmol/L Final    eGFR 01/11/2025 96.2  >60.0 mL/min/1.73 Final    Protime 01/11/2025 11.5 (L)  12.1 - 14.7 Seconds Final    INR 01/11/2025 0.83 (L)  0.90 - 1.10 Final    PTT 01/11/2025 28.2  26.5 - 34.5 seconds Final    HS Troponin T 01/11/2025 12  <14 ng/L Final    C-Reactive Protein 01/11/2025 <0.30  0.00 - 0.50 mg/dL Final    Free T4 01/11/2025 1.06  0.92 - 1.68 ng/dL Final    TSH 01/11/2025 1.640  0.270 - 4.200 uIU/mL Final    Magnesium 01/11/2025 1.9  1.6 - 2.4 mg/dL Final    QT Interval 01/11/2025 270  ms Final    QTC Interval 01/11/2025 435  ms Final    WBC 01/11/2025 6.82  3.40 - 10.80 10*3/mm3 Final    RBC 01/11/2025 4.41  3.77 - 5.28 10*6/mm3 Final    Hemoglobin 01/11/2025 13.3  12.0 - 15.9 g/dL Final    Hematocrit 01/11/2025 42.6  34.0 - 46.6 % Final    MCV 01/11/2025 96.6  79.0 - 97.0 fL Final    MCH 01/11/2025 30.2  26.6 - 33.0 pg Final    MCHC 01/11/2025 31.2 (L)  31.5 - 35.7 g/dL Final    RDW 01/11/2025 12.7  12.3 - 15.4 % Final    RDW-SD 01/11/2025 45.8  37.0 - 54.0 fl Final    MPV 01/11/2025 9.0  6.0 - 12.0 fL Final    Platelets 01/11/2025 325  140 - 450 10*3/mm3 Final    Neutrophil % 01/11/2025 59.8  42.7 - 76.0 % Final    Lymphocyte % 01/11/2025 22.0  19.6 - 45.3 % Final    Monocyte % 01/11/2025 15.0 (H)  5.0 - 12.0 % Final    Eosinophil % 01/11/2025 2.2  0.3 - 6.2 % Final    Basophil % 01/11/2025 0.9  0.0 - 1.5 % Final    Immature Grans % 01/11/2025 0.1  0.0 - 0.5 % Final    Neutrophils, Absolute 01/11/2025 4.08  1.70 - 7.00 10*3/mm3 Final    Lymphocytes, Absolute 01/11/2025 1.50  0.70 - 3.10 10*3/mm3 Final    Monocytes, Absolute 01/11/2025 1.02 (H)  0.10 - 0.90 10*3/mm3 Final     Eosinophils, Absolute 01/11/2025 0.15  0.00 - 0.40 10*3/mm3 Final    Basophils, Absolute 01/11/2025 0.06  0.00 - 0.20 10*3/mm3 Final    Immature Grans, Absolute 01/11/2025 0.01  0.00 - 0.05 10*3/mm3 Final    nRBC 01/11/2025 0.0  0.0 - 0.2 /100 WBC Final    HS Troponin T 01/11/2025 15 (H)  <14 ng/L Final    Troponin T Numeric Delta 01/11/2025 3 (C)  Abnormal if >/=3 ng/L Final    QT Interval 01/11/2025 388  ms Final    QTC Interval 01/11/2025 444  ms Final    QT Interval 01/11/2025 352  ms Final    QTC Interval 01/11/2025 423  ms Final    EF(MOD-bp) 01/12/2025 61.0  % Final    LVIDd 01/12/2025 3.5  cm Final    LVIDs 01/12/2025 2.30  cm Final    IVSd 01/12/2025 1.10  cm Final    LVPWd 01/12/2025 1.10  cm Final    FS 01/12/2025 34.3  % Final    IVS/LVPW 01/12/2025 1.00  cm Final    ESV(cubed) 01/12/2025 12.2  ml Final    LV Sys Vol (BSA corrected) 01/12/2025 9.8  cm2 Final    EDV(cubed) 01/12/2025 42.9  ml Final    LV Herman Vol (BSA corrected) 01/12/2025 26.4  cm2 Final    LV mass(C)d 01/12/2025 119.0  grams Final    LVOT area 01/12/2025 2.8  cm2 Final    LVOT diam 01/12/2025 1.90  cm Final    EDV(MOD-sp2) 01/12/2025 56.7  ml Final    EDV(MOD-sp4) 01/12/2025 43.0  ml Final    ESV(MOD-sp2) 01/12/2025 22.4  ml Final    ESV(MOD-sp4) 01/12/2025 15.9  ml Final    SV(MOD-sp2) 01/12/2025 34.3  ml Final    SV(MOD-sp4) 01/12/2025 27.1  ml Final    SVi(MOD-SP2) 01/12/2025 21.1  ml/m2 Final    SVi(MOD-SP4) 01/12/2025 16.7  ml/m2 Final    SVi (LVOT) 01/12/2025 45.8  ml/m2 Final    EF(MOD-sp2) 01/12/2025 60.5  % Final    EF(MOD-sp4) 01/12/2025 63.0  % Final    MV E max leon 01/12/2025 106.0  cm/sec Final    MV A max leon 01/12/2025 112.0  cm/sec Final    MV dec time 01/12/2025 0.22  sec Final    MV E/A 01/12/2025 0.95   Final    LA ESV Index (BP) 01/12/2025 30.4  ml/m2 Final    Med Peak E' Leon 01/12/2025 8.3  cm/sec Final    Lat Peak E' Leon 01/12/2025 13.4  cm/sec Final    TR max leon 01/12/2025 182.0  cm/sec Final    Avg E/e' ratio  01/12/2025 9.77   Final    SV(LVOT) 01/12/2025 74.6  ml Final    TAPSE (>1.6) 01/12/2025 2.8  cm Final    LA dimension (2D)  01/12/2025 2.00  cm Final    LV V1 max 01/12/2025 128.0  cm/sec Final    LV V1 max PG 01/12/2025 6.6  mmHg Final    LV V1 mean PG 01/12/2025 3.0  mmHg Final    LV V1 VTI 01/12/2025 26.3  cm Final    Ao pk abraham 01/12/2025 139.0  cm/sec Final    Ao max PG 01/12/2025 7.7  mmHg Final    Ao mean PG 01/12/2025 4.0  mmHg Final    Ao V2 VTI 01/12/2025 27.0  cm Final    GREGOR(I,D) 01/12/2025 2.8  cm2 Final    MV max PG 01/12/2025 4.8  mmHg Final    MV mean PG 01/12/2025 3.0  mmHg Final    MV V2 VTI 01/12/2025 28.8  cm Final    MVA(VTI) 01/12/2025 2.6  cm2 Final    MV dec slope 01/12/2025 485.0  cm/sec2 Final    MR max abraham 01/12/2025 193.0  cm/sec Final    MR max PG 01/12/2025 14.9  mmHg Final    TR max PG 01/12/2025 13.2  mmHg Final    RVSP(TR) 01/12/2025 23.2  mmHg Final    RAP systole 01/12/2025 10.0  mmHg Final    PA V2 max 01/12/2025 100.0  cm/sec Final    PA acc time 01/12/2025 0.09  sec Final    Ao root diam 01/12/2025 3.0  cm Final    ACS 01/12/2025 1.80  cm Final        Imaging Results (Most Recent)       None            Radiology Review:   Imaging Results (Last 72 Hours)       ** No results found for the last 72 hours. **          Reviewed LDCT imaging from 1/28/2025  Narrative & Impression   PROCEDURE: CT CHEST LOW DOSE CANCER SCREENING WO-     HISTORY: 50 pack year, current smoker; pulmonary nodules, stable  cavitation; J44.9-Chronic obstructive pulmonary disease, unspecified;  F17.210-Nicotine dependence, cigarettes, uncomplicated; R91.8-Other  nonspecific abnormal finding of lung field; J98.4-Other disorders of  lung, 69-year-old female current smoker with 50 pack year history.     TECHNIQUE: Axial images were obtained from the thoracic inlet through  the upper abdomen per the low-dose protocol. Coronal images were  reformatted. DLP 19.71 mGy.cm ; CTDI 0.44 mGy.     COMPARISON: October 4,  2023 and April 24, 2016...     FINDINGS: There is centrilobular emphysema. There is an 11 mm irregular  mass left upper lobe best seen series 2 image 99. PET/CT is recommended.  Pleural-parenchymal scarring laterally and posteriorly in the left upper  lobe is stable from prior exam. There is a subpleural 3 mm noncalcified  nodule lateral left upper lobe series 2 image 178 that is new from  prior; given the suspicious mass in the left upper lobe, recommend  3-month follow-up to document stability. This is too small to be  evaluated on PET/CT. There is a pleural-based 3 mm nodule lateral left  upper lobe which is stable. There is a 5 mm nodule anteriorly left upper  lobe noncalcified which is new.. Pleural parenchymal scarring right apex  with calcifications is stable from the prior exam. Rim calcified  cavitary lesions identified posterior medial right lower lobe, stable  from prior. The heart is normal in size. The aorta is normal in caliber.  There are no pleural or pericardial effusions. The visualized upper  abdomen is unremarkable. Bone windows reveal no acute osseous  abnormalities. Vascular calcifications noted. There is evidence of old  calcified granulomatous disease.     IMPRESSION:  Lung RADS category 4A: 11 mm irregular, suspicious left  upper lobe mass very concerning for malignancy. Recommend PET/CT.     3 and 5 mm noncalcified pulmonary nodules left upper lobe new from prior  exam; these are too small to be evaluated on PET/CT. Recommend 3-month  follow-up.     RECOMMENDATIONS: PET/CT.     3-month follow-up chest CT with contrast..     This report was signed and finalized on 1/29/2025 11:06 AM by Clarissa Lopez MD.     Reviewed PET/CT imaging from today (03/05/2025)  Narrative & Impression   EXAM:    PET/CT Skull Base to Mid Thigh     EXAM DATE:    3/5/2025 1:27 PM     CLINICAL HISTORY:    MARY JO 10 mm nodule (previously 6.8 mm in 2023, cigarette dependence;  R91.8-Other nonspecific abnormal finding of  lung field; R91.1-Solitary  pulmonary nodule; F17.210-Nicotine dependence, cigarettes, uncomplicated     TECHNIQUE:    Axial Positron Emission Tomography / Computed Tomography images were  obtained from skull base to mid thigh following the intravenous  administration of F-18 FDG. MIP reconstructed images were reviewed.     COMPARISON:    No relevant prior studies available.     FINDINGS:      HEAD:    Brain and extra-axial spaces:  Unremarkable as visualized.    Nasopharynx:  Unremarkable as visualized.      NECK:    Hypopharynx:  Unremarkable as visualized.    Larynx:  Possible physiologic uptake of the vocal cords.    Trachea:  Unremarkable as visualized.    Retropharyngeal space:  Unremarkable as visualized.    Submandibular/parotid glands:  Unremarkable as visualized.  Glands are  normal in size.    Thyroid:  Unremarkable as visualized.  No enlarged or calcified  nodules.      CHEST:    Lungs and pleural spaces:  NODULE OF THE LEFT UPPER LOBE measures 1.1  cm with PET hypermetabolism with mSUV of 3.3.  Right lower lobe  calcified scarring again noted.  No consolidation.  No significant  effusion.  No pneumothorax.    Heart:  Unremarkable as visualized.  No cardiomegaly.  No significant  pericardial effusion.    Mediastinum:  Unremarkable as visualized.  No mass.      ABDOMEN:    Liver:  Unremarkable as visualized.    Gallbladder and bile ducts:  Unremarkable as visualized.  No calcified  stones.  No ductal dilation.    Pancreas:  Unremarkable as visualized.  No ductal dilation.    Spleen:  Unremarkable as visualized.  No splenomegaly.    Adrenals:  Unremarkable as visualized.  No mass.    Kidneys and ureters:  Unremarkable as visualized.    Stomach and bowel:  Unremarkable as visualized.  No obstruction.      PELVIS:    Appendix:  No findings to suggest acute appendicitis.    Bladder:  Unremarkable as visualized.    Reproductive:  Unremarkable as visualized.      WHOLE BODY:    Intraperitoneal space:   Unremarkable as visualized.  No significant  fluid collection.  No free air.    Bones/joints:  Unremarkable as visualized.  No acute fracture.  No  dislocation.    Soft tissues:  Unremarkable as visualized.    Vasculature:  Unremarkable as visualized.  No aortic aneurysm.    Lymph nodes:  Unremarkable as visualized.  No lymphadenopathy.  No  hypermetabolic activity.     IMPRESSION:  1.  NODULE OF THE LEFT UPPER LOBE measures 1.1 cm with PET  hypermetabolism with mSUV of 3.3.        This report was finalized on 3/5/2025 1:29 PM by Dr. Erlin Noonan MD.        Reviewed spirometry performed during previous visit (10/08/2024)      Assessment / Plan         Assessment   Diagnoses and all orders for this visit:    1. Stage 4 very severe COPD by GOLD classification (Primary)  Previous spirometry from 10/2024 notable for very severe obstruction FEV1 60%  Alpha-1 genotype is normal (M/M).  Continue albuterol inhaler as needed.  Continue albuterol nebs as needed.  Continue Breztri 2 puffs twice daily (receives through [facility]).   Continue Daliresp 500 mcg daily.   May consider addition of Ohtuvayre in the future if symptoms worsen.     2. Chronic respiratory failure with hypoxia and hypercapnia  Remains compliant with utilizing supplemental oxygen continuously with baseline of 2 L.   Previously ordered NIPPV but insurance requires updated ABG which has been ordered.     3. Multiple pulmonary nodules  Previous CT chest imaging from 01/2025 revealed:    Left upper lobe pulmonary nodule that was 6.8 mm in 10/2023 increased in size to 10 mm (noted to have a gap in follow-up imaging).   Biapical scarring (stable since 2023).   Image 124: 6.4 mm irregular opacity (stable since 2023)  Few new 2-3 mm left peripheral nodules.   Right posterior cavitation (stable since 2015)  Image 326: 6.6 mm right calcified nodule (stable since 2023)  Stable calcified lymph nodes.     Imaging previously reviewed with Dr. Underwood and due to high risk  of complications in setting of severe COPD/emphsema and oxygen dependent it was recommended to proceed with PET/CT imaging.   PET/CT imaging completed today which revealed hypermetabolism of left upper lobe nodule. Patient is unsure if she would want to proceed with radiation treatment or not but is agreeable to a consultation to further discuss.     4. Cigarette nicotine dependence without complication  Federica Rahman  reports that she has been smoking cigarettes. She has a 50 pack-year smoking history. She has never used smokeless tobacco.   Not ready to achieve cessation at this time.     There are no discontinued medications.     No orders of the defined types were placed in this encounter.    I spent 35 minutes caring for Federica on this date of service. This time includes time spent by me in the following activities:preparing for the visit, reviewing tests, obtaining and/or reviewing a separately obtained history, performing a medically appropriate examination and/or evaluation , counseling and educating the patient/family/caregiver, ordering medications, tests, or procedures, referring and communicating with other health care professionals , documenting information in the medical record, independently interpreting results and communicating that information with the patient/family/caregiver, and care coordination    Follow Up   Return in about 3 months (around 6/5/2025), or if symptoms worsen or fail to improve, for Next scheduled follow up.    Patient was given instructions and counseling regarding her condition or for health maintenance advice. Please see specific information pulled into the AVS if appropriate.       This document has been electronically signed by Janet Regan PA-C   March 5, 2025 17:16 EST    Dictated Utilizing Dragon Dictation: Part of this note may be an electronic transcription/translation of spoken language to printed text using the Dragon Dictation System.

## 2025-03-05 NOTE — PROGRESS NOTES
[facility]  COPD Management       Federica Rahman is a 69 y.o. female seen in the [facility] for COPD.  The patient's current COPD regimen includes: Breztri, PRN albuterol HFA, PRN albuterol nebs, and Daliresp 500 mcg daily. Pt reports good adherence to maintenance regimen. Pt reports she is a smoker and smokes about 0.5 PPD. This has decreased from smoking 1 PPD.     Federica Rahman reports that she has seen much improvement in her breathing since switching to Breztri and starting Daliresp. She says that she is rinsing her mouth out following Breztri use. Her Daliresp was increased to 500 mcg daily at last appointment and she has not had any problems with it. She has an albuterol HFA and nebs but seldomly uses them.     Patient receives Breztri through [facility] and denies any trouble receiving medication.       Past Medical History:   Diagnosis Date    COPD (chronic obstructive pulmonary disease)     Osteoporosis      Social History     Socioeconomic History    Marital status:    Tobacco Use    Smoking status: Every Day     Current packs/day: 1.00     Average packs/day: 1 pack/day for 50.0 years (50.0 ttl pk-yrs)     Types: Cigarettes    Smokeless tobacco: Never   Vaping Use    Vaping status: Never Used   Substance and Sexual Activity    Alcohol use: No    Drug use: No    Sexual activity: Defer     Other    Current Outpatient Medications:     albuterol (PROVENTIL) (2.5 MG/3ML) 0.083% nebulizer solution, Take 2.5 mg by nebulization Every 4 (Four) Hours As Needed for Wheezing., Disp: , Rfl:     albuterol sulfate  (90 Base) MCG/ACT inhaler, Inhale 2 puffs Every 4 (Four) Hours As Needed for Wheezing., Disp: , Rfl:     Budeson-Glycopyrrol-Formoterol (Breztri Aerosphere) 160-9-4.8 MCG/ACT aerosol inhaler, Inhale 2 puffs 2 (Two) Times a Day., Disp: , Rfl:     Eliquis 5 MG tablet tablet, Take 1 tablet by mouth twice daily, Disp: 60 tablet, Rfl: 0    escitalopram (LEXAPRO)  10 MG tablet, Take 1 tablet by mouth Daily., Disp: , Rfl:     metoprolol tartrate (LOPRESSOR) 25 MG tablet, Take 1 tablet by mouth 2 (Two) Times a Day., Disp: 180 tablet, Rfl: 1    nitroglycerin (NITROSTAT) 0.4 MG SL tablet, Place 1 tablet under the tongue Every 5 (Five) Minutes As Needed for Chest Pain. Take no more than 3 doses in 15 minutes., Disp: , Rfl:     roflumilast (Daliresp) 500 MCG tablet tablet, Take 1 tablet by mouth Daily., Disp: 30 tablet, Rfl: 6    vitamin D (ERGOCALCIFEROL) 1.25 MG (72459 UT) capsule capsule, Take 1 capsule by mouth 1 (One) Time Per Week., Disp: , Rfl:     rosuvastatin (CRESTOR) 20 MG tablet, Take 1 tablet by mouth Daily. (Patient not taking: Reported on 3/5/2025), Disp: , Rfl:   No current facility-administered medications for this encounter.        Vaccination Status   COVID 19: UTD  Influenza: Declined  Pneumococcal: Reports UTD  Zoster: Declined    Drug-Drug Interactions: No significant interactions    Drug-Disease Interactions (non-cardioselective beta blockers): N/A    Patient Assistance: Breztri through [facility]; Dairesp $2.02/month (Fills at [facility])    Inhaler Technique Observed? No  If yes, notes:     Treatment Goals: Risk Reduction and Symptom Control     Medication Assessment & Plan:     Patient will continue Breztri 2 puffs BID (receiving through [facility]) and Daliresp 500 mcg daily.    Advised Pt on the importance of continuing maintenance inhaler regimen and the importance of rinsing mouth after ICS use.     Counseled Pt on the importance of smoking cessation & offered referral to [facility] smoking cessation program. Patient declined at this time and plans to cut back on her own.     Thank you for allowing me to participate in the care of your patient,    Alicia Prince, MaciD  3/5/2025  14:14 EST

## 2025-03-06 NOTE — ADDENDUM NOTE
Encounter addended by: Janet Regan PA-C on: 3/6/2025 1:36 PM   Actions taken: Order list changed, Diagnosis association updated, In Basket message sent

## 2025-03-10 ENCOUNTER — PATIENT ROUNDING (BHMG ONLY) (OUTPATIENT)
Dept: RADIATION ONCOLOGY | Facility: HOSPITAL | Age: 70
End: 2025-03-10

## 2025-03-10 ENCOUNTER — CONSULT (OUTPATIENT)
Dept: RADIATION ONCOLOGY | Facility: HOSPITAL | Age: 70
End: 2025-03-10
Payer: MEDICARE

## 2025-03-10 ENCOUNTER — HOSPITAL ENCOUNTER (OUTPATIENT)
Dept: RADIATION ONCOLOGY | Facility: HOSPITAL | Age: 70
Setting detail: RADIATION/ONCOLOGY SERIES
End: 2025-03-10
Payer: MEDICARE

## 2025-03-10 ENCOUNTER — DOCUMENTATION (OUTPATIENT)
Dept: RADIATION ONCOLOGY | Facility: HOSPITAL | Age: 70
End: 2025-03-10
Payer: MEDICARE

## 2025-03-10 VITALS
HEART RATE: 80 BPM | TEMPERATURE: 97.5 F | OXYGEN SATURATION: 98 % | SYSTOLIC BLOOD PRESSURE: 122 MMHG | DIASTOLIC BLOOD PRESSURE: 72 MMHG | RESPIRATION RATE: 16 BRPM

## 2025-03-10 DIAGNOSIS — R91.8 MULTIPLE PULMONARY NODULES: Primary | ICD-10-CM

## 2025-03-10 DIAGNOSIS — Z12.31 SCREENING MAMMOGRAM FOR BREAST CANCER: ICD-10-CM

## 2025-03-10 PROCEDURE — G0463 HOSPITAL OUTPT CLINIC VISIT: HCPCS | Performed by: RADIOLOGY

## 2025-03-10 RX ORDER — GUAIFENESIN 600 MG/1
1200 TABLET, EXTENDED RELEASE ORAL 2 TIMES DAILY
COMMUNITY

## 2025-03-10 NOTE — PROGRESS NOTES
RN completed radiation education with patient. Patient was provided with an educational folder with all paper documents. RN thoroughly educated patient about the importance of skin care beginning the day of CT Simulation, scheduled for 3-31 due to patient going out of town. Patient expressed clear understanding with minimal questions at this time. Consent obtained during today's visit. Patient aware to call with any concerns.

## 2025-03-10 NOTE — PROGRESS NOTES
New Patient Office Consult      Patient Name:       Federica Rahman  :                     1955   MRN:                     [id number]   Date of Service:   03/10/2025    Requesting Physician:  Omar Ingram MD    Reason for Referral:  Enlarging hypermetabolic nodule in the left upper lobe which is suspicious for a primary pulmonary malignancy.  Review radiotherapy options.    History of Present Illness:  Mrs. Federica Rahman is a pleasant 69 y.o. white female whose past medical history is remarkable for COPD with chronic respiratory failure, mixed hyperlipidemia, hypertension, osteoporosis, and a history of atrial fibrillation.  The patient also reports a 57-year pack history of smoking.    A surveillance low-dose CT scan of chest (2025) was compared to prior studies of 10/04/2023 and 2016.  An 11 mm irregular shaped mass was noted in the left upper lobe.  The upper lobe mass measured 6 mm on the surveillance CT scan of chest obtained on 10/04/2023.  Noncalcified 5 mm and 3 mm nodules were also present in the left upper lobe.  The smaller nodules were new when compared to the prior low-dose CT scan.    A PET CT scan (2025) showed hypermetabolism with max SUV of 3.3 in the left upper lobe nodule.  No other sites of suspicious hypermetabolism were identified.    The left upper lobe nodule may represent a primary lung malignancy.  The patient was felt to be at high risk for a biopsy procedure due to her COPD/emphysema.    Mrs. Rahman is referred to our service to review radiotherapy options for her presumed left upper lobe malignancy.    Subjective      Review of Systems:   Constitutional: Positive for fatigue and occasional night sweats.  Negative for fever, chills, recent infections, diminished appetite, and nonintentional weight loss  ENT: Positive for diminished vision and bilateral hearing loss (uses hearing aids)  Cardiovascular: Negative  Pulmonary: Positive for shortness of  breath and dyspnea on exertion (after walking one half block).  Patient is receiving supplemental oxygen 2 L/min  Gastrointestinal: Negative  Genitourinary: Positive for urinary incontinence  Integumentary: Negative  Hematopoietic: Negative  Immunologic: Negative  Lymphatic: Negative  Musculoskeletal: Negative  Neurologic: Negative  Psychiatric: Negative    Past Oncology History:   Oncology/Hematology History    No history exists.      Past Radiation History:  No previous exposures to ionizing radiation therapy and there are not relative contraindications including connective tissue disorder.     Past Medical History:   Past Medical History:   Diagnosis Date    COPD (chronic obstructive pulmonary disease)     Hypertension     Osteoporosis         Mixed hyperlipidemia       History of atrial fibrillation    Past Surgical History:   Past Surgical History:   Procedure Laterality Date    APPENDECTOMY      BRONCHOSCOPY       SECTION      COLONOSCOPY      SHOULDER SURGERY         Family History:   Family History   Problem Relation Age of Onset    Heart failure Mother     Heart disease Father     COPD Father     COPD Brother     Multiple myeloma Maternal Aunt     Ovarian cancer Maternal Aunt     Lung cancer Maternal Uncle     Skin cancer Maternal Grandmother         Melanoma                                                 Maternal Uncle        Social History:   Social History     Socioeconomic History    Marital status:    Tobacco Use    Smoking status: Every Day     Current packs/day: 1.00     Average packs/day: 1 pack/day for 50.0 years (50.0 ttl pk-yrs)     Types: Cigarettes    Smokeless tobacco: Never   Vaping Use    Vaping status: Never Used   Substance and Sexual Activity    Alcohol use: No    Drug use: No    Sexual activity: Defer     The patient resides at [place], [place].  . Five adult offspring-living and apparently healthy.  Education: High school graduate with some college.  No  history of  service.  The patient is retired.  Past occupations include factory and .  Patient has smoked 1 pack of cigarettes per day x 57 years.  She is attempting to quit smoking.  Nondrinker.    Medications:     Current Outpatient Medications:     albuterol (PROVENTIL) (2.5 MG/3ML) 0.083% nebulizer solution, Take 2.5 mg by nebulization Every 4 (Four) Hours As Needed for Wheezing., Disp: , Rfl:     albuterol sulfate  (90 Base) MCG/ACT inhaler, Inhale 2 puffs Every 4 (Four) Hours As Needed for Wheezing., Disp: , Rfl:     Budeson-Glycopyrrol-Formoterol (Breztri Aerosphere) 160-9-4.8 MCG/ACT aerosol inhaler, Inhale 2 puffs 2 (Two) Times a Day., Disp: , Rfl:     Eliquis 5 MG tablet tablet, Take 1 tablet by mouth twice daily, Disp: 60 tablet, Rfl: 0    escitalopram (LEXAPRO) 10 MG tablet, Take 1 tablet by mouth Daily., Disp: , Rfl:     metoprolol tartrate (LOPRESSOR) 25 MG tablet, Take 1 tablet by mouth 2 (Two) Times a Day., Disp: 180 tablet, Rfl: 1    nitroglycerin (NITROSTAT) 0.4 MG SL tablet, Place 1 tablet under the tongue Every 5 (Five) Minutes As Needed for Chest Pain. Take no more than 3 doses in 15 minutes., Disp: , Rfl:     vitamin D (ERGOCALCIFEROL) 1.25 MG (00406 UT) capsule capsule, Take 1 capsule by mouth 1 (One) Time Per Week., Disp: , Rfl:     guaiFENesin (MUCINEX) 600 MG 12 hr tablet, Take 2 tablets by mouth 2 (Two) Times a Day., Disp: , Rfl:     roflumilast (Daliresp) 500 MCG tablet tablet, Take 1 tablet by mouth Daily., Disp: 30 tablet, Rfl: 6    rosuvastatin (CRESTOR) 20 MG tablet, Take 1 tablet by mouth Daily. (Patient not taking: Reported on 3/5/2025), Disp: , Rfl:     Allergies:   No Known Allergies    PHQ-9 Depression Screening  Little interest or pleasure in doing things?     Feeling down, depressed, or hopeless?     PHQ-2 Total Score     Trouble falling or staying asleep, or sleeping too much?     Feeling tired or having little energy?     Poor appetite or  overeating?     Feeling bad about yourself - or that you are a failure or have let yourself or your family down?     Trouble concentrating on things, such as reading the newspaper or watching television?     Moving or speaking so slowly that other people could have noticed? Or the opposite - being so fidgety or restless that you have been moving around a lot more than usual?     Thoughts that you would be better off dead, or of hurting yourself in some way?     PHQ-9 Total Score     If you checked off any problems, how difficult have these problems made it for you to do your work, take care of things at home, or get along with other people?       Distress Screenin     KPS: 80 %     IMAGING:  NM PET/CT Skull Base to Mid Thigh  Result Date: 3/5/2025  1.  NODULE OF THE LEFT UPPER LOBE measures 1.1 cm with PET hypermetabolism with mSUV of 3.3.   This report was finalized on 3/5/2025 1:29 PM by Dr. Erlin Noonan MD.      CT Chest Low Dose Cancer Screening WO  Result Date: 2025  Lung RADS category 4A: 11 mm irregular, suspicious left upper lobe mass very concerning for malignancy. Recommend PET/CT.  3 and 5 mm noncalcified pulmonary nodules left upper lobe new from prior exam; these are too small to be evaluated on PET/CT. Recommend 3-month follow-up.  RECOMMENDATIONS: PET/CT.  3-month follow-up chest CT with contrast..  This report was signed and finalized on 2025 11:06 AM by Clarissa Lopez MD.      CT Angiogram Chest Pulmonary Embolism  Result Date: 2025   Emphysema. Bronchial inflammation. Small areas of cavitation in the superior segment of the right lower lobe could represent underlying chronic infectious process or underlying neoplastic process with adjacent pulmonary parenchymal scarring and/or spiculation from underlying neoplastic lesion. No thoracic aortic aneurysm or dissection. No pulmonary embolus No pericardial effusion. Small mediastinal and small bilateral hilar nodes. Right lower lobe  calcified granuloma. No acute process seen in the upper abdomen.   This report was finalized on 1/11/2025 8:09 PM by Channing Cox MD.      XR Chest 1 View  Result Date: 1/11/2025  1.  No consolidative airspace disease. 2.  Linear opacities of the right upper lobe may represent atelectasis and/or pneumonia. 3.  Increased bronchial markings may represent acute and/or chronic bronchitis. 4.  Emphysema with hyperinflation, stable.  This report was finalized on 1/11/2025 7:04 PM by Dr. Jules Reed MD.         Pathology:  None (the patient was felt to be at high risk for pulmonary biopsies due to underlying COPD/emphysema).    Objective     Physical Exam:  The patient is a well-nourished, well-developed, elderly white female in no acute distress.  The patient is in a mobility scooter, and she is receiving supplemental oxygen via nasal cannula.  KPS 80.    Vital Signs:   Vitals:    03/10/25 0858   BP: 122/72   Pulse: 80   Resp: 16   Temp: 97.5 °F (36.4 °C)   TempSrc: Temporal   SpO2: 98%  Comment: @2L   PainSc: 0-No pain     There is no height or weight on file to calculate BMI.     Head: Normocephalic, atraumatic.  ENT: Eyes PERRLA, EOMs intact.  Sclera and conjunctiva clear.  Mouth: Edentulous with upper and lower dental plates in place.  No intraoral lesions noted.  The tongue is freely mobile.  Ears: Bilateral hearing aids in place.  Neck: Short, supple, trachea midline.  No thyromegaly.  No detectable cervical or periclavicular adenopathy.  No JVD.  Heart: Regular rate and rhythm  Lungs: Clear to auscultation bilaterally  Lymphatics: No detectable cervical, periclavicular, axillary or inguinal adenopathy  Breasts: No breast masses detected.  Abdomen: Soft, nontender, no organomegaly.  Vertical scar noted over anterior pelvic wall  Integumentary: No suspicious lesions noted on sun exposed skin  Extremities: Symmetric, no cyanosis, clubbing or edema.  Surgical scars noted at right inner upper forearm and at left  shoulder.  Musculoskeletal: No swollen or erythematous joints.  No pain on fist percussion of cervical, thoracic or lumbosacral spines.  No costal tenderness.  Neurologic: Cranial nerves II through XII appear intact no motor, sensory, or cerebellar deficit.  Normal gait.  Psychiatric: Normal mood and affect.  Alert and well oriented x 3.    Assessment / Plan      Assessment:  Suspicious hypermetabolic 11 mm nodule in the left upper lobe which is clinically and radiographically suspicious for malignancy. C34.12    Recommendations:  I have reviewed Mrs. Rahman's medical records and imaging studies.  The patient has a hypermetabolic 11 mm left upper lung mass.  The mass previously measured 6 mm on a low-dose screening chest CT scan obtained on 10/04/2023.  The lung lesion may represent a primary lung malignancy.  The patient is not felt to be a candidate for a biopsy procedure due to the risk of lung collapse from a CT directed needle biopsy.  The patient has COPD/emphysema and is receiving supplemental oxygen.    I explained to the patient and her  (Ge Sales) that I suspect that the lung lesion is of malignant origin due to the patient's history of smoking, older age, tumor location (studies indicate that nodules in the upper lobes may be more indicative of cancer) hypermetabolism on the PET scan which indicates a high probability of lung cancer, and tumor size.  Lesions greater than 10 mm have a 15 to 20% chance of harboring malignancy.    Mrs. Rahman appears to be a good candidate for stereotactic body radiotherapy (SBRT).  SBRT is a treatment modality that administers high doses of radiotherapy using multiple noncoplanar beams in a reduced number of treatment fractions to a target site.  SBRT maximizes the dose delivery to the tumor and minimizes the radiation exposure of adjacent normal tissues.  The unique radiobiologic characteristics of focused high-dose radiotherapy have been shown to  provide improved tumor control rates when compared to conventionally fractionated radiotherapy.  SBRT is now the treatment of choice for early-stage non-small cell lung cancers and for oligometastatic disease.    I explained to the patient that SBRT will consist in the administration of 5000 cGy in 5 treatment fractions administered on an alternate day basis.  I reviewed the rationale for SBRT, duration of treatment, expected outcomes, possible acute and chronic side effects, and posttreatment surveillance measures with the patient.  Mrs. Rahman's many treatment related questions were answered to her satisfaction.  The patient wishes to proceed with radiotherapy as outlined.    The patient states that she will take a planned vacation from 03/17 to 03/30/2025.  The patient will return to the cancer center on 03/31/2024 for a CT planning procedure to establish radiotherapy fields.  The treatment planning CAT scan will be fused electronically with the PET CT scan of 03/05/2025 to delineate target sites.  I advised the patient that the treatment planning process may take several days.  She will be contacted with the radiation therapy start date.    Time spent with patient 60 minutes (the total time included previsit review of medical records and imaging studies, obtaining an independent history of present illness, performing an appropriate medical examination/evaluation, patient counseling, and coordination of care).    Thank you very much for allowing our participation in this very pleasant lady's treatment.    Talat Corona MD   03/10/25 10:39    cc   MD Yanni Kwok PA-C Khalid R. Chaudry, MD [place], [facility] APRN

## 2025-03-31 ENCOUNTER — HOSPITAL ENCOUNTER (OUTPATIENT)
Dept: RADIATION ONCOLOGY | Facility: HOSPITAL | Age: 70
Discharge: HOME | End: 2025-03-31
Payer: MEDICARE

## 2025-03-31 PROCEDURE — 77334 RADIATION TREATMENT AID(S): CPT | Performed by: RADIOLOGY

## 2025-03-31 PROCEDURE — 77332 RADIATION TREATMENT AID(S): CPT | Performed by: RADIOLOGY

## 2025-04-01 ENCOUNTER — HOSPITAL ENCOUNTER (OUTPATIENT)
Dept: RADIATION ONCOLOGY | Facility: HOSPITAL | Age: 70
Setting detail: RADIATION/ONCOLOGY SERIES
End: 2025-04-01
Payer: MEDICARE

## 2025-04-07 ENCOUNTER — HOSPITAL ENCOUNTER (OUTPATIENT)
Dept: MAMMOGRAPHY | Facility: HOSPITAL | Age: 70
Discharge: HOME | End: 2025-04-07
Admitting: RADIOLOGY
Payer: MEDICARE

## 2025-04-07 DIAGNOSIS — Z12.31 SCREENING MAMMOGRAM FOR BREAST CANCER: ICD-10-CM

## 2025-04-07 DIAGNOSIS — R91.8 MULTIPLE PULMONARY NODULES: ICD-10-CM

## 2025-04-07 PROCEDURE — 77293 RESPIRATOR MOTION MGMT SIMUL: CPT | Performed by: RADIOLOGY

## 2025-04-07 PROCEDURE — 77063 BREAST TOMOSYNTHESIS BI: CPT

## 2025-04-07 PROCEDURE — 77301 RADIOTHERAPY DOSE PLAN IMRT: CPT | Performed by: RADIOLOGY

## 2025-04-07 PROCEDURE — 77067 SCR MAMMO BI INCL CAD: CPT

## 2025-04-07 PROCEDURE — 77300 RADIATION THERAPY DOSE PLAN: CPT | Performed by: RADIOLOGY

## 2025-04-07 PROCEDURE — 77067 SCR MAMMO BI INCL CAD: CPT | Performed by: RADIOLOGY

## 2025-04-07 PROCEDURE — 77063 BREAST TOMOSYNTHESIS BI: CPT | Performed by: RADIOLOGY

## 2025-04-07 PROCEDURE — 77338 DESIGN MLC DEVICE FOR IMRT: CPT | Performed by: RADIOLOGY

## 2025-04-09 ENCOUNTER — TELEPHONE (OUTPATIENT)
Dept: RADIATION ONCOLOGY | Facility: HOSPITAL | Age: 70
End: 2025-04-09
Payer: MEDICARE

## 2025-04-09 NOTE — TELEPHONE ENCOUNTER
I called to give patient her appointment time for radiation. Left message on voicemail to call back.

## 2025-04-22 ENCOUNTER — HOSPITAL ENCOUNTER (OUTPATIENT)
Dept: RADIATION ONCOLOGY | Facility: HOSPITAL | Age: 70
Discharge: HOME | End: 2025-04-22

## 2025-04-22 LAB
RAD ONC ARIA COURSE ID: NORMAL
RAD ONC ARIA COURSE INTENT: NORMAL
RAD ONC ARIA COURSE LAST TREATMENT DATE: NORMAL
RAD ONC ARIA COURSE START DATE: NORMAL
RAD ONC ARIA COURSE TREATMENT ELAPSED DAYS: 0
RAD ONC ARIA FIRST TREATMENT DATE: NORMAL
RAD ONC ARIA PLAN FRACTIONS TREATED TO DATE: 1
RAD ONC ARIA PLAN ID: NORMAL
RAD ONC ARIA PLAN PRESCRIBED DOSE PER FRACTION: 10 GY
RAD ONC ARIA PLAN PRIMARY REFERENCE POINT: NORMAL
RAD ONC ARIA PLAN TOTAL FRACTIONS PRESCRIBED: 5
RAD ONC ARIA PLAN TOTAL PRESCRIBED DOSE: 5000 CGY
RAD ONC ARIA REFERENCE POINT DOSAGE GIVEN TO DATE: 10 GY
RAD ONC ARIA REFERENCE POINT ID: NORMAL
RAD ONC ARIA REFERENCE POINT SESSION DOSAGE GIVEN: 10 GY

## 2025-04-22 PROCEDURE — 77336 RADIATION PHYSICS CONSULT: CPT | Performed by: RADIOLOGY

## 2025-04-22 PROCEDURE — 77373 STRTCTC BDY RAD THER TX DLVR: CPT | Performed by: RADIOLOGY

## 2025-04-24 ENCOUNTER — HOSPITAL ENCOUNTER (OUTPATIENT)
Dept: RADIATION ONCOLOGY | Facility: HOSPITAL | Age: 70
Discharge: HOME | End: 2025-04-24

## 2025-04-24 LAB

## 2025-04-24 PROCEDURE — 77373 STRTCTC BDY RAD THER TX DLVR: CPT | Performed by: RADIOLOGY

## 2025-04-28 ENCOUNTER — HOSPITAL ENCOUNTER (OUTPATIENT)
Dept: RADIATION ONCOLOGY | Facility: HOSPITAL | Age: 70
Discharge: HOME | End: 2025-04-28
Payer: MEDICARE

## 2025-04-28 LAB
RAD ONC ARIA COURSE ID: NORMAL
RAD ONC ARIA COURSE INTENT: NORMAL
RAD ONC ARIA COURSE LAST TREATMENT DATE: NORMAL
RAD ONC ARIA COURSE START DATE: NORMAL
RAD ONC ARIA COURSE TREATMENT ELAPSED DAYS: 6
RAD ONC ARIA FIRST TREATMENT DATE: NORMAL
RAD ONC ARIA PLAN FRACTIONS TREATED TO DATE: 3
RAD ONC ARIA PLAN ID: NORMAL
RAD ONC ARIA PLAN PRESCRIBED DOSE PER FRACTION: 10 GY
RAD ONC ARIA PLAN PRIMARY REFERENCE POINT: NORMAL
RAD ONC ARIA PLAN TOTAL FRACTIONS PRESCRIBED: 5
RAD ONC ARIA PLAN TOTAL PRESCRIBED DOSE: 5000 CGY
RAD ONC ARIA REFERENCE POINT DOSAGE GIVEN TO DATE: 30 GY
RAD ONC ARIA REFERENCE POINT ID: NORMAL
RAD ONC ARIA REFERENCE POINT SESSION DOSAGE GIVEN: 10 GY

## 2025-04-28 PROCEDURE — 77373 STRTCTC BDY RAD THER TX DLVR: CPT | Performed by: RADIOLOGY

## 2025-04-30 ENCOUNTER — TELEPHONE (OUTPATIENT)
Dept: RADIATION ONCOLOGY | Facility: HOSPITAL | Age: 70
End: 2025-04-30
Payer: MEDICARE

## 2025-04-30 ENCOUNTER — APPOINTMENT (OUTPATIENT)
Dept: GENERAL RADIOLOGY | Facility: HOSPITAL | Age: 70
End: 2025-04-30
Payer: MEDICARE

## 2025-04-30 ENCOUNTER — HOSPITAL ENCOUNTER (EMERGENCY)
Facility: HOSPITAL | Age: 70
Discharge: HOME | End: 2025-04-30
Payer: MEDICARE

## 2025-04-30 VITALS
WEIGHT: 117 LBS | BODY MASS INDEX: 18.8 KG/M2 | HEART RATE: 85 BPM | SYSTOLIC BLOOD PRESSURE: 112 MMHG | RESPIRATION RATE: 17 BRPM | DIASTOLIC BLOOD PRESSURE: 62 MMHG | TEMPERATURE: 98.3 F | HEIGHT: 66 IN | OXYGEN SATURATION: 99 %

## 2025-04-30 DIAGNOSIS — R07.9 CHEST PAIN, UNSPECIFIED TYPE: Primary | ICD-10-CM

## 2025-04-30 LAB
ALBUMIN SERPL-MCNC: 3.7 G/DL (ref 3.5–5.2)
ALBUMIN/GLOB SERPL: 1.5 G/DL
ALP SERPL-CCNC: 82 U/L (ref 39–117)
ALT SERPL W P-5'-P-CCNC: 10 U/L (ref 1–33)
ANION GAP SERPL CALCULATED.3IONS-SCNC: 10.4 MMOL/L (ref 5–15)
AST SERPL-CCNC: 16 U/L (ref 1–32)
BASOPHILS # BLD AUTO: 0.04 10*3/MM3 (ref 0–0.2)
BASOPHILS NFR BLD AUTO: 0.7 % (ref 0–1.5)
BILIRUB SERPL-MCNC: 0.4 MG/DL (ref 0–1.2)
BUN SERPL-MCNC: 14 MG/DL (ref 8–23)
BUN/CREAT SERPL: 25.9 (ref 7–25)
CALCIUM SPEC-SCNC: 8.8 MG/DL (ref 8.6–10.5)
CHLORIDE SERPL-SCNC: 100 MMOL/L (ref 98–107)
CK SERPL-CCNC: 56 U/L (ref 20–180)
CO2 SERPL-SCNC: 27.6 MMOL/L (ref 22–29)
CREAT SERPL-MCNC: 0.54 MG/DL (ref 0.57–1)
CRP SERPL-MCNC: <0.3 MG/DL (ref 0–0.5)
DEPRECATED RDW RBC AUTO: 44.9 FL (ref 37–54)
EGFRCR SERPLBLD CKD-EPI 2021: 99.8 ML/MIN/1.73
EOSINOPHIL # BLD AUTO: 0.12 10*3/MM3 (ref 0–0.4)
EOSINOPHIL NFR BLD AUTO: 2.2 % (ref 0.3–6.2)
ERYTHROCYTE [DISTWIDTH] IN BLOOD BY AUTOMATED COUNT: 12.6 % (ref 12.3–15.4)
GEN 5 1HR TROPONIN T REFLEX: 10 NG/L
GLOBULIN UR ELPH-MCNC: 2.4 GM/DL
GLUCOSE SERPL-MCNC: 109 MG/DL (ref 65–99)
HCT VFR BLD AUTO: 41.4 % (ref 34–46.6)
HGB BLD-MCNC: 13.2 G/DL (ref 12–15.9)
HOLD SPECIMEN: NORMAL
HOLD SPECIMEN: NORMAL
IMM GRANULOCYTES # BLD AUTO: 0.02 10*3/MM3 (ref 0–0.05)
IMM GRANULOCYTES NFR BLD AUTO: 0.4 % (ref 0–0.5)
LYMPHOCYTES # BLD AUTO: 1.06 10*3/MM3 (ref 0.7–3.1)
LYMPHOCYTES NFR BLD AUTO: 19.4 % (ref 19.6–45.3)
MAGNESIUM SERPL-MCNC: 1.9 MG/DL (ref 1.6–2.4)
MCH RBC QN AUTO: 30.5 PG (ref 26.6–33)
MCHC RBC AUTO-ENTMCNC: 31.9 G/DL (ref 31.5–35.7)
MCV RBC AUTO: 95.6 FL (ref 79–97)
MONOCYTES # BLD AUTO: 0.7 10*3/MM3 (ref 0.1–0.9)
MONOCYTES NFR BLD AUTO: 12.8 % (ref 5–12)
NEUTROPHILS NFR BLD AUTO: 3.53 10*3/MM3 (ref 1.7–7)
NEUTROPHILS NFR BLD AUTO: 64.5 % (ref 42.7–76)
NRBC BLD AUTO-RTO: 0 /100 WBC (ref 0–0.2)
NT-PROBNP SERPL-MCNC: 246.7 PG/ML (ref 0–900)
PLATELET # BLD AUTO: 240 10*3/MM3 (ref 140–450)
PMV BLD AUTO: 9.2 FL (ref 6–12)
POTASSIUM SERPL-SCNC: 4.4 MMOL/L (ref 3.5–5.2)
PROT SERPL-MCNC: 6.1 G/DL (ref 6–8.5)
QT INTERVAL: 282 MS
QTC INTERVAL: 377 MS
RBC # BLD AUTO: 4.33 10*6/MM3 (ref 3.77–5.28)
SODIUM SERPL-SCNC: 138 MMOL/L (ref 136–145)
TROPONIN T NUMERIC DELTA: -1 NG/L
TROPONIN T SERPL HS-MCNC: 11 NG/L
TSH SERPL DL<=0.05 MIU/L-ACNC: 1.15 UIU/ML (ref 0.27–4.2)
WBC NRBC COR # BLD AUTO: 5.47 10*3/MM3 (ref 3.4–10.8)
WHOLE BLOOD HOLD COAG: NORMAL
WHOLE BLOOD HOLD SPECIMEN: NORMAL

## 2025-04-30 PROCEDURE — 83880 ASSAY OF NATRIURETIC PEPTIDE: CPT | Performed by: PHYSICIAN ASSISTANT

## 2025-04-30 PROCEDURE — 86140 C-REACTIVE PROTEIN: CPT | Performed by: PHYSICIAN ASSISTANT

## 2025-04-30 PROCEDURE — 82550 ASSAY OF CK (CPK): CPT | Performed by: PHYSICIAN ASSISTANT

## 2025-04-30 PROCEDURE — 83735 ASSAY OF MAGNESIUM: CPT | Performed by: PHYSICIAN ASSISTANT

## 2025-04-30 PROCEDURE — 25810000003 SODIUM CHLORIDE 0.9 % SOLUTION: Performed by: PHYSICIAN ASSISTANT

## 2025-04-30 PROCEDURE — 85025 COMPLETE CBC W/AUTO DIFF WBC: CPT

## 2025-04-30 PROCEDURE — 36415 COLL VENOUS BLD VENIPUNCTURE: CPT

## 2025-04-30 PROCEDURE — 71045 X-RAY EXAM CHEST 1 VIEW: CPT | Performed by: RADIOLOGY

## 2025-04-30 PROCEDURE — 71045 X-RAY EXAM CHEST 1 VIEW: CPT

## 2025-04-30 PROCEDURE — 84484 ASSAY OF TROPONIN QUANT: CPT

## 2025-04-30 PROCEDURE — 80053 COMPREHEN METABOLIC PANEL: CPT

## 2025-04-30 PROCEDURE — 99284 EMERGENCY DEPT VISIT MOD MDM: CPT

## 2025-04-30 PROCEDURE — 93005 ELECTROCARDIOGRAM TRACING: CPT

## 2025-04-30 PROCEDURE — 84443 ASSAY THYROID STIM HORMONE: CPT | Performed by: PHYSICIAN ASSISTANT

## 2025-04-30 RX ORDER — ASPIRIN 81 MG/1
324 TABLET, CHEWABLE ORAL ONCE
Status: DISCONTINUED | OUTPATIENT
Start: 2025-04-30 | End: 2025-04-30

## 2025-04-30 RX ORDER — SODIUM CHLORIDE 0.9 % (FLUSH) 0.9 %
10 SYRINGE (ML) INJECTION AS NEEDED
Status: DISCONTINUED | OUTPATIENT
Start: 2025-04-30 | End: 2025-04-30 | Stop reason: HOSPADM

## 2025-04-30 RX ADMIN — SODIUM CHLORIDE 1000 ML: 9 INJECTION, SOLUTION INTRAVENOUS at 09:31

## 2025-04-30 NOTE — TELEPHONE ENCOUNTER
Family member called in to report that pt is having chest pains - and will be going to the ER - made team aware. cm

## 2025-04-30 NOTE — ED PROVIDER NOTES
Subjective     History provided by:  Patient  Chest Pain  Pain location:  Substernal area and L lateral chest  Pain quality: stabbing    Pain radiates to:  L arm  Pain severity:  Moderate  Onset quality:  Sudden  Duration:  4 hours  Timing:  Intermittent  Progression:  Waxing and waning  Chronicity:  New  Context: at rest    Relieved by:  Nitroglycerin  Associated symptoms: palpitations and shortness of breath    Associated symptoms: no abdominal pain and no fever    Risk factors: hypertension and smoking        Review of Systems   Constitutional: Negative.  Negative for fever.   HENT: Negative.     Respiratory:  Positive for shortness of breath.    Cardiovascular:  Positive for chest pain and palpitations.   Gastrointestinal: Negative.  Negative for abdominal pain.   Endocrine: Negative.    Genitourinary: Negative.  Negative for dysuria.   Skin: Negative.    Neurological: Negative.    Psychiatric/Behavioral: Negative.     All other systems reviewed and are negative.      Past Medical History:   Diagnosis Date    COPD (chronic obstructive pulmonary disease)     Hypertension     Osteoporosis        No Known Allergies    Past Surgical History:   Procedure Laterality Date    APPENDECTOMY      BRONCHOSCOPY       SECTION      COLONOSCOPY      SHOULDER SURGERY         Family History   Problem Relation Age of Onset    Heart failure Mother     Heart disease Father     COPD Father     COPD Brother     Skin cancer Maternal Grandmother     Multiple myeloma Maternal Aunt     Ovarian cancer Maternal Aunt     Lung cancer Maternal Uncle     Breast cancer Neg Hx        Social History     Socioeconomic History    Marital status:    Tobacco Use    Smoking status: Every Day     Current packs/day: 1.00     Average packs/day: 1 pack/day for 50.0 years (50.0 ttl pk-yrs)     Types: Cigarettes    Smokeless tobacco: Never   Vaping Use    Vaping status: Never Used   Substance and Sexual Activity    Alcohol use: No    Drug  use: No    Sexual activity: Defer           Objective   Physical Exam  Vitals and nursing note reviewed.   Constitutional:       General: She is not in acute distress.     Appearance: She is well-developed. She is not diaphoretic.   HENT:      Head: Normocephalic and atraumatic.      Right Ear: External ear normal.      Left Ear: External ear normal.      Nose: Nose normal.   Eyes:      Conjunctiva/sclera: Conjunctivae normal.   Neck:      Vascular: No JVD.      Trachea: No tracheal deviation.   Cardiovascular:      Rate and Rhythm: Tachycardia present. Rhythm irregular.      Heart sounds: Normal heart sounds. No murmur heard.  Pulmonary:      Effort: Pulmonary effort is normal. No respiratory distress.      Breath sounds: Examination of the right-upper field reveals decreased breath sounds. Decreased breath sounds present. No wheezing.   Abdominal:      Palpations: Abdomen is soft.      Tenderness: There is no abdominal tenderness.   Musculoskeletal:         General: No deformity.      Cervical back: Normal range of motion and neck supple.   Skin:     General: Skin is warm and dry.      Coloration: Skin is not pale.      Findings: No erythema or rash.   Neurological:      Mental Status: She is alert and oriented to person, place, and time.      Cranial Nerves: No cranial nerve deficit.   Psychiatric:         Behavior: Behavior normal.         Thought Content: Thought content normal.         Procedures           ED Course  ED Course as of 04/30/25 2048 Wed Apr 30, 2025   1133 XR chest rad interpreted:  Coarsened interstitial markings noted throughout the lungs. [RB]   1134 Echo in January of this year stress test in June of last year.  Troponins did not indicate any type of NSTEMI.  Other laboratory findings are normal.  Patient is continue all her outpatient visits.  Follow-up with her cardiologist. [RB]      ED Course User Index  [RB] Teddy Flowers II, PA                  HEART Score: 4   Shared Decision  Making  I discussed the findings with the patient/patient representative who is in agreement with the treatment plan and the final disposition.  Risks and benefits of discharge and/or observation/admission were discussed: Yes                                      Medical Decision Making  Problems Addressed:  Chest pain, unspecified type: complicated acute illness or injury    Amount and/or Complexity of Data Reviewed  Labs: ordered.  Radiology: ordered.  ECG/medicine tests: ordered.    Risk  OTC drugs.  Prescription drug management.        Final diagnoses:   Chest pain, unspecified type       ED Disposition  ED Disposition       ED Disposition   Discharge    Condition   Stable    Comment   --               Bhupendra Lara, APRN  [address]  [facility]  [facility] [place] [address]  [phone]    Schedule an appointment as soon as possible for a visit       Janel Aldana MD  [address] [facility] [address]  [phone]    Schedule an appointment as soon as possible for a visit            Medication List      No changes were made to your prescriptions during this visit.            Teddy Flowers II, PA  04/30/25 [id number]

## 2025-05-01 ENCOUNTER — HOSPITAL ENCOUNTER (OUTPATIENT)
Dept: RADIATION ONCOLOGY | Facility: HOSPITAL | Age: 70
Setting detail: RADIATION/ONCOLOGY SERIES
End: 2025-05-01
Payer: MEDICARE

## 2025-05-02 ENCOUNTER — HOSPITAL ENCOUNTER (OUTPATIENT)
Dept: RADIATION ONCOLOGY | Facility: HOSPITAL | Age: 70
Discharge: HOME | End: 2025-05-02

## 2025-05-02 VITALS
HEART RATE: 74 BPM | WEIGHT: 119 LBS | TEMPERATURE: 97.8 F | BODY MASS INDEX: 19.21 KG/M2 | RESPIRATION RATE: 20 BRPM | SYSTOLIC BLOOD PRESSURE: 137 MMHG | DIASTOLIC BLOOD PRESSURE: 74 MMHG | OXYGEN SATURATION: 95 %

## 2025-05-02 DIAGNOSIS — R91.8 MULTIPLE PULMONARY NODULES: Primary | ICD-10-CM

## 2025-05-02 LAB
RAD ONC ARIA COURSE ID: NORMAL
RAD ONC ARIA COURSE INTENT: NORMAL
RAD ONC ARIA COURSE LAST TREATMENT DATE: NORMAL
RAD ONC ARIA COURSE START DATE: NORMAL
RAD ONC ARIA COURSE TREATMENT ELAPSED DAYS: 10
RAD ONC ARIA FIRST TREATMENT DATE: NORMAL
RAD ONC ARIA PLAN FRACTIONS TREATED TO DATE: 4
RAD ONC ARIA PLAN ID: NORMAL
RAD ONC ARIA PLAN PRESCRIBED DOSE PER FRACTION: 10 GY
RAD ONC ARIA PLAN PRIMARY REFERENCE POINT: NORMAL
RAD ONC ARIA PLAN TOTAL FRACTIONS PRESCRIBED: 5
RAD ONC ARIA PLAN TOTAL PRESCRIBED DOSE: 5000 CGY
RAD ONC ARIA REFERENCE POINT DOSAGE GIVEN TO DATE: 40 GY
RAD ONC ARIA REFERENCE POINT ID: NORMAL
RAD ONC ARIA REFERENCE POINT SESSION DOSAGE GIVEN: 10 GY

## 2025-05-02 PROCEDURE — 77373 STRTCTC BDY RAD THER TX DLVR: CPT | Performed by: RADIOLOGY

## 2025-05-02 NOTE — PROGRESS NOTES
On Treatment Visit Note      Patient Name: Federica Rahman  : 1955   MRN: [id number]     Diagnosis:    Chief Complaint   Patient presents with    Lung Cancer     MARY JO SBRT       Staging: No matching staging information was found for the patient.       This patient was seen today for an on treatment visit.  This week in response to chest pain patient visited the ER and apparently her cardiac workup was negative.  Despite the above she has appointment with her cardiologist will follow through on same.  She has had no recurrence of the chest pain nor shortness of breath no dyspnea on exertion.  She is actually tolerating her SBRT extremely well with no related problems    Radiation Treatments       Active   Plans   MARY JO SBRT   Most recent treatment: Dose planned: 1,000 cGy (fraction 4 on 2025)   Total: Dose planned: 5,000 cGy (5 fractions)   Elapsed Days: 10      Reference Points   PTV   Most recent treatment: Dose given: 1,000 cGy (on 2025)   Total: Dose given: 4,000 cGy   Elapsed Days: 10           Historical   No historical radiation treatments to show.              Subjective      Review of Systems:   Review of Systems    Medications:     Current Outpatient Medications:     albuterol (PROVENTIL) (2.5 MG/3ML) 0.083% nebulizer solution, Take 2.5 mg by nebulization Every 4 (Four) Hours As Needed for Wheezing., Disp: , Rfl:     albuterol sulfate  (90 Base) MCG/ACT inhaler, Inhale 2 puffs Every 4 (Four) Hours As Needed for Wheezing., Disp: , Rfl:     Budeson-Glycopyrrol-Formoterol (Breztri Aerosphere) 160-9-4.8 MCG/ACT aerosol inhaler, Inhale 2 puffs 2 (Two) Times a Day., Disp: , Rfl:     Eliquis 5 MG tablet tablet, Take 1 tablet by mouth twice daily, Disp: 60 tablet, Rfl: 0    escitalopram (LEXAPRO) 10 MG tablet, Take 1 tablet by mouth Daily., Disp: , Rfl:     guaiFENesin (MUCINEX) 600 MG 12 hr tablet, Take 2 tablets by mouth 2 (Two) Times a Day., Disp: , Rfl:     metoprolol tartrate  (LOPRESSOR) 25 MG tablet, Take 1 tablet by mouth 2 (Two) Times a Day., Disp: 180 tablet, Rfl: 1    nitroglycerin (NITROSTAT) 0.4 MG SL tablet, Place 1 tablet under the tongue Every 5 (Five) Minutes As Needed for Chest Pain. Take no more than 3 doses in 15 minutes., Disp: , Rfl:     roflumilast (Daliresp) 500 MCG tablet tablet, Take 1 tablet by mouth Daily., Disp: 30 tablet, Rfl: 6    vitamin D (ERGOCALCIFEROL) 1.25 MG (45277 UT) capsule capsule, Take 1 capsule by mouth 1 (One) Time Per Week., Disp: , Rfl:     rosuvastatin (CRESTOR) 20 MG tablet, Take 1 tablet by mouth Daily. (Patient not taking: Reported on 3/5/2025), Disp: , Rfl:     Allergies:   No Known Allergies  Objective     Physical Exam: Patient looks very well.  Her chest is completely clear.  There is no radiation dermatitis at all within the radiation portals.  There is no supraclavicular infraclavicular or other adenopathy.  Is regular rate rhythm without S3-S4 murmur rub  Physical Exam    Vital Signs:   Vitals:    05/02/25 0927   BP: 137/74   Pulse: 74   Resp: 20   Temp: 97.8 °F (36.6 °C)   TempSrc: Temporal   SpO2: 95%  Comment: 2L   Weight: 54 kg (119 lb)   PainSc: 0-No pain     Body mass index is 19.21 kg/m².     Current Total XRT Dose (cGY):    Radiation Treatments       Active   Plans   MARY JO SBRT   Most recent treatment: Dose planned: 1,000 cGy (fraction 4 on 5/2/2025)   Total: Dose planned: 5,000 cGy (5 fractions)   Elapsed Days: 10      Reference Points   PTV   Most recent treatment: Dose given: 1,000 cGy (on 5/2/2025)   Total: Dose given: 4,000 cGy   Elapsed Days: 10           Historical   No historical radiation treatments to show.              Plan      Plan:   Patient was seen today for an on treatment visit. Patient is receiving radiation therapy to the peripheral nodule in upper lobe of left lung. Patient is stable and tolerating radiation therapy well with minimal side effects. Continue with radiation therapy.  Follow through with  appointment with her cardiologist    I have reviewed treatment setup notes, checked and approved the daily guidance images. I reviewed dose delivery, treatment parameters and deemed them appropriate. We plan to continue radiation therapy as prescribed.     Digital speech recognition software was used to dictate parts of this note, some dictation errors may occur.    This document has been electronically signed by Seth Pollard MD   May 2, 2025 09:44 EDT    Dictated Utilizing Dragon Dictation: Part of this note may be an electronic transcription/translation of spoken language to printed text using the Dragon Dictation System.

## 2025-05-06 ENCOUNTER — HOSPITAL ENCOUNTER (OUTPATIENT)
Dept: RADIATION ONCOLOGY | Facility: HOSPITAL | Age: 70
Discharge: HOME | End: 2025-05-06

## 2025-05-06 LAB
RAD ONC ARIA COURSE ID: NORMAL
RAD ONC ARIA COURSE INTENT: NORMAL
RAD ONC ARIA COURSE LAST TREATMENT DATE: NORMAL
RAD ONC ARIA COURSE START DATE: NORMAL
RAD ONC ARIA COURSE TREATMENT ELAPSED DAYS: 14
RAD ONC ARIA FIRST TREATMENT DATE: NORMAL
RAD ONC ARIA PLAN FRACTIONS TREATED TO DATE: 5
RAD ONC ARIA PLAN ID: NORMAL
RAD ONC ARIA PLAN PRESCRIBED DOSE PER FRACTION: 10 GY
RAD ONC ARIA PLAN PRIMARY REFERENCE POINT: NORMAL
RAD ONC ARIA PLAN TOTAL FRACTIONS PRESCRIBED: 5
RAD ONC ARIA PLAN TOTAL PRESCRIBED DOSE: 5000 CGY
RAD ONC ARIA REFERENCE POINT DOSAGE GIVEN TO DATE: 50 GY
RAD ONC ARIA REFERENCE POINT ID: NORMAL
RAD ONC ARIA REFERENCE POINT SESSION DOSAGE GIVEN: 10 GY

## 2025-05-06 PROCEDURE — 77373 STRTCTC BDY RAD THER TX DLVR: CPT | Performed by: RADIOLOGY

## 2025-05-12 ENCOUNTER — OFFICE VISIT (OUTPATIENT)
Dept: CARDIOLOGY | Facility: CLINIC | Age: 70
End: 2025-05-12
Payer: MEDICARE

## 2025-05-12 ENCOUNTER — TELEPHONE (OUTPATIENT)
Dept: CARDIOLOGY | Facility: CLINIC | Age: 70
End: 2025-05-12

## 2025-05-12 VITALS
SYSTOLIC BLOOD PRESSURE: 120 MMHG | BODY MASS INDEX: 19.03 KG/M2 | OXYGEN SATURATION: 96 % | HEART RATE: 92 BPM | HEIGHT: 66 IN | WEIGHT: 118.4 LBS | DIASTOLIC BLOOD PRESSURE: 72 MMHG

## 2025-05-12 DIAGNOSIS — Z79.01 CHRONIC ANTICOAGULATION: ICD-10-CM

## 2025-05-12 DIAGNOSIS — I48.0 PAROXYSMAL ATRIAL FIBRILLATION: Primary | ICD-10-CM

## 2025-05-12 DIAGNOSIS — I47.29 NONSUSTAINED VENTRICULAR TACHYCARDIA: ICD-10-CM

## 2025-05-12 DIAGNOSIS — E78.2 MIXED HYPERLIPIDEMIA: ICD-10-CM

## 2025-05-12 DIAGNOSIS — F17.210 CIGARETTE SMOKER: ICD-10-CM

## 2025-05-12 LAB
RAD ONC ARIA COURSE END DATE: NORMAL
RAD ONC ARIA COURSE ID: NORMAL
RAD ONC ARIA COURSE INTENT: NORMAL
RAD ONC ARIA COURSE LAST TREATMENT DATE: NORMAL
RAD ONC ARIA COURSE START DATE: NORMAL
RAD ONC ARIA COURSE TREATMENT ELAPSED DAYS: 14
RAD ONC ARIA FIRST TREATMENT DATE: NORMAL
RAD ONC ARIA PLAN FRACTIONS TREATED TO DATE: 5
RAD ONC ARIA PLAN ID: NORMAL
RAD ONC ARIA PLAN NAME: NORMAL
RAD ONC ARIA PLAN PRESCRIBED DOSE PER FRACTION: 10 GY
RAD ONC ARIA PLAN PRIMARY REFERENCE POINT: NORMAL
RAD ONC ARIA PLAN TOTAL FRACTIONS PRESCRIBED: 5
RAD ONC ARIA PLAN TOTAL PRESCRIBED DOSE: 5000 CGY
RAD ONC ARIA REFERENCE POINT DOSAGE GIVEN TO DATE: 50 GY
RAD ONC ARIA REFERENCE POINT ID: NORMAL

## 2025-05-12 RX ORDER — METOPROLOL TARTRATE 25 MG/1
37.5 TABLET, FILM COATED ORAL 2 TIMES DAILY
Qty: 180 TABLET | Refills: 1 | Status: SHIPPED | OUTPATIENT
Start: 2025-05-12

## 2025-05-12 NOTE — PROGRESS NOTES
subjective     Chief Complaint   Patient presents with    Atrial Fibrillation       Problems Addressed This Visit  1. Paroxysmal atrial fibrillation    2. Mixed hyperlipidemia    3. Chronic anticoagulation    4. Cigarette smoker        History of Present Illness  History of Present Illness  The patient is a 69-year-old white female with a history of paroxysmal atrial fibrillation, hyperlipidemia,  COPD, ongoing tobacco use and pulmonary nodules.  S    She recently went to the emergency room with atypical chest pain.  She was found to have atrial fibrillation with controlled ventricular rate.  Chest x-ray did not show any acute changes troponin was normal.  Patient was discharged without any change in medications.  She is taking metoprolol 25 twice daily and Eliquis 5 mg twice daily.      She reports no drug side effects, abnormal bleeding, or bruising.     She has not had any further chest discomfort or palpitations.  She has hyperlipidemia and was previously on Crestor but discontinued it due to adverse effects such as bad dreams. She has since resumed the medication.    Last echocardiogram did not show any pericardial effusion LV ejection fraction was normal.  Last stress test was negative for significant exercise-induced myocardial ischemia.    She has COPD but continues to smoke. She is not utilizing oxygen today but keeps it in her car for emergencies.    She has pulmonary nodules and is under the care of a pulmonologist. She completed her last radiation therapy session last week.    SOCIAL HISTORY  She continues to smoke.          Past Surgical History:   Procedure Laterality Date    APPENDECTOMY      BRONCHOSCOPY       SECTION      COLONOSCOPY      SHOULDER SURGERY       Family History   Problem Relation Age of Onset    Heart failure Mother     Heart disease Father     COPD Father     COPD Brother     Skin cancer Maternal Grandmother     Multiple myeloma Maternal Aunt     Ovarian cancer Maternal Aunt      Lung cancer Maternal Uncle     Breast cancer Neg Hx      Past Medical History:   Diagnosis Date    COPD (chronic obstructive pulmonary disease)     Hypertension     Osteoporosis      Patient Active Problem List   Diagnosis    Chronic obstructive pulmonary disease    Hypoxia    Sleep apnea    Shortness of breath    Pneumonia due to Mycoplasma pneumoniae    Allergic rhinitis    Hypersomnia    Cough    Osteoporosis    Essential hypertension    Paroxysmal atrial fibrillation    Idiopathic pericarditis, resolved    Chest pain, atypical, resolved    Cigarette smoker    Nonsustained ventricular tachycardia    Mixed hyperlipidemia    A-fib    Chronic anticoagulation       Social History     Tobacco Use    Smoking status: Every Day     Current packs/day: 1.00     Average packs/day: 1 pack/day for 50.0 years (50.0 ttl pk-yrs)     Types: Cigarettes     Passive exposure: Never    Smokeless tobacco: Never   Vaping Use    Vaping status: Never Used   Substance Use Topics    Alcohol use: No    Drug use: No       No Known Allergies    Current Outpatient Medications on File Prior to Visit   Medication Sig    albuterol (PROVENTIL) (2.5 MG/3ML) 0.083% nebulizer solution Take 2.5 mg by nebulization Every 4 (Four) Hours As Needed for Wheezing.    albuterol sulfate  (90 Base) MCG/ACT inhaler Inhale 2 puffs Every 4 (Four) Hours As Needed for Wheezing.    Budeson-Glycopyrrol-Formoterol (Breztri Aerosphere) 160-9-4.8 MCG/ACT aerosol inhaler Inhale 2 puffs 2 (Two) Times a Day.    Eliquis 5 MG tablet tablet Take 1 tablet by mouth twice daily    escitalopram (LEXAPRO) 10 MG tablet Take 1 tablet by mouth Daily.    guaiFENesin (MUCINEX) 600 MG 12 hr tablet Take 2 tablets by mouth 2 (Two) Times a Day.    metoprolol tartrate (LOPRESSOR) 25 MG tablet Take 1 tablet by mouth 2 (Two) Times a Day.    nitroglycerin (NITROSTAT) 0.4 MG SL tablet Place 1 tablet under the tongue Every 5 (Five) Minutes As Needed for Chest Pain. Take no more than 3  doses in 15 minutes.    roflumilast (Daliresp) 500 MCG tablet tablet Take 1 tablet by mouth Daily.    vitamin D (ERGOCALCIFEROL) 1.25 MG (32128 UT) capsule capsule Take 1 capsule by mouth 1 (One) Time Per Week.    rosuvastatin (CRESTOR) 20 MG tablet Take 1 tablet by mouth Daily. (Patient not taking: Reported on 3/5/2025)     No current facility-administered medications on file prior to visit.     (Not in a hospital admission)      Results for orders placed during the hospital encounter of 01/11/25    Adult Transthoracic Echo Complete W/ Cont if Necessary Per Protocol    Interpretation Summary    Left ventricular small in size with estimated ejection fraction of 60 to 65%. Normal diastolic function.    Right ventricle is normal in size and function.    No evidence of a patent foramen ovale. No evidence of an atrial septal defect present. Left atrium is mildly enlarged.    The inferior vena cava is normally sized. Right atrium appears normal in size.    The aortic valve is not clearly visualized. There is no significant aortic stenosis or regurgitation.    There is trace mitral regurgitation.    Insufficient TR velocity profile to estimate the right ventricular systolic pressure. There is trace tricuspid regurgitation.    Trace pericardial effusion.    Results for orders placed during the hospital encounter of 06/03/24    Stress test with myocardial perfusion one day    Interpretation Summary    Myocardial perfusion imaging indicates a normal myocardial perfusion study with no evidence of ischemia.    Left ventricular ejection fraction is normal.    TID 1.14.    Findings consistent with a normal ECG stress test.          The following portions of the patient's history were reviewed and updated as appropriate: allergies, current medications, past family history, past medical history, past social history, past surgical history and problem list.    Review of Systems   Constitutional: Negative.   HENT: Negative.  Negative  "for congestion.    Eyes: Negative.    Cardiovascular: Negative.  Negative for chest pain, cyanosis, dyspnea on exertion, irregular heartbeat, leg swelling, near-syncope, orthopnea, palpitations, paroxysmal nocturnal dyspnea and syncope.   Respiratory: Negative.  Negative for shortness of breath.    Hematologic/Lymphatic: Negative.    Musculoskeletal: Negative.    Gastrointestinal: Negative.    Neurological: Negative.  Negative for headaches.          Objective:     /72 (BP Location: Left arm, Patient Position: Sitting, Cuff Size: Adult)   Pulse 92   Ht 167.6 cm (66\")   Wt 53.7 kg (118 lb 6.4 oz)   SpO2 96%   BMI 19.11 kg/m²   Pulmonary:      Effort: Pulmonary effort is normal.      Breath sounds: Normal breath sounds. No wheezing.   Cardiovascular:      PMI at left midclavicular line. Normal rate. Regular rhythm. Normal S1. Normal S2.       Murmurs: There is no murmur.      No gallop.  No click. No rub.   Pulses:     Intact distal pulses.   Edema:     Peripheral edema absent.       Physical Exam        Lab Review  Lab Results   Component Value Date     04/30/2025    K 4.4 04/30/2025     04/30/2025    BUN 14 04/30/2025    CREATININE 0.54 (L) 04/30/2025    GLUCOSE 109 (H) 04/30/2025    CALCIUM 8.8 04/30/2025    ALT 10 04/30/2025    ALKPHOS 82 04/30/2025     Lab Results   Component Value Date    CKTOTAL 56 04/30/2025     Lab Results   Component Value Date    WBC 5.47 04/30/2025    HGB 13.2 04/30/2025    HCT 41.4 04/30/2025     04/30/2025     Lab Results   Component Value Date    INR 0.83 (L) 01/11/2025     Lab Results   Component Value Date    MG 1.9 04/30/2025     Lab Results   Component Value Date    TSH 1.150 04/30/2025     Lab Results   Component Value Date    BNP 19 04/21/2016     Lab Results   Component Value Date    CHOL 204 (H) 08/17/2023    TRIG 81 08/17/2023    HDL 91 (H) 08/17/2023    VLDL 14 08/17/2023    LDL 99 08/17/2023     Lab Results   Component Value Date    LDL 99 " 08/17/2023     Lab Results   Component Value Date    PROBNP 246.7 04/30/2025                 ECG 12 Lead    Date/Time: 5/12/2025 2:11 PM  Performed by: Janel Aldana MD    Authorized by: Janel Aldana MD  Comparison: compared with previous ECG from 4/30/2025  Comparison to previous ECG: Sinus rhythm has replaced atrial fibrillation.  Rhythm: sinus rhythm  Rate: normal  BPM: 92  Conduction: conduction normal  QRS axis: normal  Other findings: non-specific ST-T wave changes and right atrial abnormality    Clinical impression: abnormal EKG          Results       I personally viewed and interpreted the patient's LAB data         Assessment:     1. Paroxysmal atrial fibrillation    2. Mixed hyperlipidemia    3. Chronic anticoagulation    4. Cigarette smoker        Assessment & Plan  1. Paroxysmal atrial fibrillation.  She is successfully maintaining sinus rhythm. The current regimen of metoprolol 25 mg twice daily and Eliquis 5 mg twice daily.  Patient was advised to increase metoprolol 25 mg 1-1/2 tablet twice a day.   Eliquis will be continued.  No drug side effects.    2. Ongoing tobacco use.  She has been strongly advised to cease smoking.      4. Hyperlipidemia.  Emphasis on maintaining a healthy lifestyle has been made. She is under the close supervision of her primary care physician and is currently on Crestor.    Follow-up  The patient will follow up in 4 months.               No follow-ups on file.

## 2025-05-13 ENCOUNTER — TELEPHONE (OUTPATIENT)
Dept: CARDIOLOGY | Facility: CLINIC | Age: 70
End: 2025-05-13
Payer: MEDICARE

## 2025-05-13 NOTE — TELEPHONE ENCOUNTER
SPOKE WITH PT, ADV DR. MICHAEL WANTS PT TAKING METOPROLOL 1.5 TABLETS IN AM AND 1.5 TABLETS IN PM.

## 2025-05-19 ENCOUNTER — TRANSCRIBE ORDERS (OUTPATIENT)
Dept: ADMINISTRATIVE | Facility: HOSPITAL | Age: 70
End: 2025-05-19
Payer: MEDICARE

## 2025-05-19 DIAGNOSIS — Z12.2 ENCOUNTER FOR SCREENING FOR MALIGNANT NEOPLASM OF LUNG: ICD-10-CM

## 2025-05-19 DIAGNOSIS — J44.9 CHRONIC OBSTRUCTIVE PULMONARY DISEASE, UNSPECIFIED COPD TYPE: Primary | ICD-10-CM

## 2025-06-10 DIAGNOSIS — J96.11 CHRONIC RESPIRATORY FAILURE WITH HYPOXIA AND HYPERCAPNIA: ICD-10-CM

## 2025-06-10 DIAGNOSIS — J96.12 CHRONIC RESPIRATORY FAILURE WITH HYPOXIA AND HYPERCAPNIA: ICD-10-CM

## 2025-06-10 DIAGNOSIS — J44.9 STAGE 4 VERY SEVERE COPD BY GOLD CLASSIFICATION: ICD-10-CM

## 2025-06-10 RX ORDER — ROFLUMILAST 500 UG/1
500 TABLET ORAL DAILY
Qty: 30 TABLET | Refills: 6 | Status: SHIPPED | OUTPATIENT
Start: 2025-06-10

## 2025-06-11 ENCOUNTER — HOSPITAL ENCOUNTER (OUTPATIENT)
Dept: CT IMAGING | Facility: HOSPITAL | Age: 70
Discharge: HOME | End: 2025-06-11
Payer: MEDICARE

## 2025-06-11 ENCOUNTER — TRANSCRIBE ORDERS (OUTPATIENT)
Dept: ADMINISTRATIVE | Facility: HOSPITAL | Age: 70
End: 2025-06-11
Payer: MEDICARE

## 2025-06-11 DIAGNOSIS — R31.9 HEMATURIA SYNDROME: Primary | ICD-10-CM

## 2025-06-11 DIAGNOSIS — R31.9 HEMATURIA SYNDROME: ICD-10-CM

## 2025-06-11 PROCEDURE — 74176 CT ABD & PELVIS W/O CONTRAST: CPT

## 2025-06-24 ENCOUNTER — TELEPHONE (OUTPATIENT)
Dept: CARDIOLOGY | Facility: CLINIC | Age: 70
End: 2025-06-24
Payer: MEDICARE

## 2025-06-24 RX ORDER — METOPROLOL TARTRATE 25 MG/1
37.5 TABLET, FILM COATED ORAL 2 TIMES DAILY
Qty: 135 TABLET | Refills: 1 | Status: SHIPPED | OUTPATIENT
Start: 2025-06-24

## 2025-06-24 NOTE — TELEPHONE ENCOUNTER
Caller: Federica Rahman    Relationship to patient: Self    Best call back number: [phone]    Patient is needing: PLEASE CALL THE PATIENT TO DISCUSS DOSAGE OF METOPROLOL

## 2025-07-10 ENCOUNTER — HOSPITAL ENCOUNTER (OUTPATIENT)
Dept: CT IMAGING | Facility: HOSPITAL | Age: 70
Discharge: HOME | End: 2025-07-10
Admitting: NURSE PRACTITIONER
Payer: MEDICARE

## 2025-07-10 DIAGNOSIS — J44.9 CHRONIC OBSTRUCTIVE PULMONARY DISEASE, UNSPECIFIED COPD TYPE: ICD-10-CM

## 2025-07-10 PROCEDURE — 25510000001 IOPAMIDOL 61 % SOLUTION: Performed by: NURSE PRACTITIONER

## 2025-07-10 PROCEDURE — 71260 CT THORAX DX C+: CPT

## 2025-07-10 RX ORDER — IOPAMIDOL 612 MG/ML
100 INJECTION, SOLUTION INTRAVASCULAR
Status: COMPLETED | OUTPATIENT
Start: 2025-07-10 | End: 2025-07-10

## 2025-07-10 RX ADMIN — IOPAMIDOL 60 ML: 612 INJECTION, SOLUTION INTRAVENOUS at 13:05

## 2025-07-29 ENCOUNTER — APPOINTMENT (OUTPATIENT)
Dept: GENERAL RADIOLOGY | Facility: HOSPITAL | Age: 70
End: 2025-07-29
Payer: MEDICARE

## 2025-07-29 ENCOUNTER — HOSPITAL ENCOUNTER (EMERGENCY)
Facility: HOSPITAL | Age: 70
Discharge: HOME | End: 2025-07-30
Payer: MEDICARE

## 2025-07-29 DIAGNOSIS — R31.9 HEMATURIA, UNSPECIFIED TYPE: ICD-10-CM

## 2025-07-29 DIAGNOSIS — R06.02 SHORTNESS OF BREATH: Primary | ICD-10-CM

## 2025-07-29 LAB
ALBUMIN SERPL-MCNC: 4.1 G/DL (ref 3.5–5.2)
ALBUMIN/GLOB SERPL: 1.9 G/DL
ALP SERPL-CCNC: 69 U/L (ref 39–117)
ALT SERPL W P-5'-P-CCNC: 11 U/L (ref 1–33)
ANION GAP SERPL CALCULATED.3IONS-SCNC: 14.2 MMOL/L (ref 5–15)
APTT PPP: 30.8 SECONDS (ref 24.5–35.9)
AST SERPL-CCNC: 20 U/L (ref 1–32)
BACTERIA UR QL AUTO: ABNORMAL /HPF
BASOPHILS # BLD AUTO: 0.06 10*3/MM3 (ref 0–0.2)
BASOPHILS NFR BLD AUTO: 0.6 % (ref 0–1.5)
BILIRUB SERPL-MCNC: 0.4 MG/DL (ref 0–1.2)
BILIRUB UR QL STRIP: NEGATIVE
BUN SERPL-MCNC: 14.3 MG/DL (ref 8–23)
BUN/CREAT SERPL: 27.5 (ref 7–25)
CALCIUM SPEC-SCNC: 9.3 MG/DL (ref 8.6–10.5)
CHLORIDE SERPL-SCNC: 99 MMOL/L (ref 98–107)
CLARITY UR: ABNORMAL
CO2 SERPL-SCNC: 25.8 MMOL/L (ref 22–29)
COLOR UR: YELLOW
CREAT SERPL-MCNC: 0.52 MG/DL (ref 0.57–1)
D-LACTATE SERPL-SCNC: 1.2 MMOL/L (ref 0.5–2)
DEPRECATED RDW RBC AUTO: 44 FL (ref 37–54)
EGFRCR SERPLBLD CKD-EPI 2021: 100.7 ML/MIN/1.73
EOSINOPHIL # BLD AUTO: 0.14 10*3/MM3 (ref 0–0.4)
EOSINOPHIL NFR BLD AUTO: 1.4 % (ref 0.3–6.2)
ERYTHROCYTE [DISTWIDTH] IN BLOOD BY AUTOMATED COUNT: 12.4 % (ref 12.3–15.4)
GEN 5 1HR TROPONIN T REFLEX: 14 NG/L
GLOBULIN UR ELPH-MCNC: 2.2 GM/DL
GLUCOSE SERPL-MCNC: 102 MG/DL (ref 65–99)
GLUCOSE UR STRIP-MCNC: NEGATIVE MG/DL
HCT VFR BLD AUTO: 44.3 % (ref 34–46.6)
HGB BLD-MCNC: 13.9 G/DL (ref 12–15.9)
HGB UR QL STRIP.AUTO: ABNORMAL
HOLD SPECIMEN: NORMAL
HYALINE CASTS UR QL AUTO: ABNORMAL /LPF
IMM GRANULOCYTES # BLD AUTO: 0.03 10*3/MM3 (ref 0–0.05)
IMM GRANULOCYTES NFR BLD AUTO: 0.3 % (ref 0–0.5)
INR PPP: 1.05 (ref 0.9–1.1)
KETONES UR QL STRIP: ABNORMAL
LEUKOCYTE ESTERASE UR QL STRIP.AUTO: ABNORMAL
LYMPHOCYTES # BLD AUTO: 1.17 10*3/MM3 (ref 0.7–3.1)
LYMPHOCYTES NFR BLD AUTO: 11.4 % (ref 19.6–45.3)
MCH RBC QN AUTO: 30.3 PG (ref 26.6–33)
MCHC RBC AUTO-ENTMCNC: 31.4 G/DL (ref 31.5–35.7)
MCV RBC AUTO: 96.5 FL (ref 79–97)
MONOCYTES # BLD AUTO: 1.15 10*3/MM3 (ref 0.1–0.9)
MONOCYTES NFR BLD AUTO: 11.2 % (ref 5–12)
NEUTROPHILS NFR BLD AUTO: 7.72 10*3/MM3 (ref 1.7–7)
NEUTROPHILS NFR BLD AUTO: 75.1 % (ref 42.7–76)
NITRITE UR QL STRIP: NEGATIVE
NRBC BLD AUTO-RTO: 0 /100 WBC (ref 0–0.2)
PH UR STRIP.AUTO: <=5 [PH] (ref 5–8)
PLATELET # BLD AUTO: 201 10*3/MM3 (ref 140–450)
PMV BLD AUTO: 9.4 FL (ref 6–12)
POTASSIUM SERPL-SCNC: 4 MMOL/L (ref 3.5–5.2)
PROCALCITONIN SERPL-MCNC: 0.06 NG/ML (ref 0–0.25)
PROT SERPL-MCNC: 6.3 G/DL (ref 6–8.5)
PROT UR QL STRIP: ABNORMAL
PROTHROMBIN TIME: 14.3 SECONDS (ref 12.5–15.2)
RBC # BLD AUTO: 4.59 10*6/MM3 (ref 3.77–5.28)
RBC # UR STRIP: ABNORMAL /HPF
REF LAB TEST METHOD: ABNORMAL
SODIUM SERPL-SCNC: 139 MMOL/L (ref 136–145)
SP GR UR STRIP: 1.02 (ref 1–1.03)
SQUAMOUS #/AREA URNS HPF: ABNORMAL /HPF
TROPONIN T % DELTA: -7
TROPONIN T NUMERIC DELTA: -1 NG/L
TROPONIN T SERPL HS-MCNC: 15 NG/L
UROBILINOGEN UR QL STRIP: ABNORMAL
WBC # UR STRIP: ABNORMAL /HPF
WBC NRBC COR # BLD AUTO: 10.27 10*3/MM3 (ref 3.4–10.8)
WHOLE BLOOD HOLD COAG: NORMAL
WHOLE BLOOD HOLD SPECIMEN: NORMAL

## 2025-07-29 PROCEDURE — 80053 COMPREHEN METABOLIC PANEL: CPT

## 2025-07-29 PROCEDURE — 93010 ELECTROCARDIOGRAM REPORT: CPT | Performed by: INTERNAL MEDICINE

## 2025-07-29 PROCEDURE — 71045 X-RAY EXAM CHEST 1 VIEW: CPT

## 2025-07-29 PROCEDURE — 84484 ASSAY OF TROPONIN QUANT: CPT

## 2025-07-29 PROCEDURE — 81001 URINALYSIS AUTO W/SCOPE: CPT

## 2025-07-29 PROCEDURE — 87040 BLOOD CULTURE FOR BACTERIA: CPT

## 2025-07-29 PROCEDURE — 83605 ASSAY OF LACTIC ACID: CPT

## 2025-07-29 PROCEDURE — 93005 ELECTROCARDIOGRAM TRACING: CPT

## 2025-07-29 PROCEDURE — 85025 COMPLETE CBC W/AUTO DIFF WBC: CPT

## 2025-07-29 PROCEDURE — 85730 THROMBOPLASTIN TIME PARTIAL: CPT

## 2025-07-29 PROCEDURE — 84145 PROCALCITONIN (PCT): CPT

## 2025-07-29 PROCEDURE — 99284 EMERGENCY DEPT VISIT MOD MDM: CPT

## 2025-07-29 PROCEDURE — 85610 PROTHROMBIN TIME: CPT

## 2025-07-29 PROCEDURE — 71045 X-RAY EXAM CHEST 1 VIEW: CPT | Performed by: RADIOLOGY

## 2025-07-29 PROCEDURE — 36415 COLL VENOUS BLD VENIPUNCTURE: CPT

## 2025-07-29 RX ORDER — SODIUM CHLORIDE 0.9 % (FLUSH) 0.9 %
10 SYRINGE (ML) INJECTION AS NEEDED
Status: DISCONTINUED | OUTPATIENT
Start: 2025-07-29 | End: 2025-07-30 | Stop reason: HOSPADM

## 2025-07-30 VITALS
BODY MASS INDEX: 17.27 KG/M2 | DIASTOLIC BLOOD PRESSURE: 82 MMHG | HEIGHT: 67 IN | HEART RATE: 90 BPM | TEMPERATURE: 97.6 F | RESPIRATION RATE: 16 BRPM | OXYGEN SATURATION: 98 % | SYSTOLIC BLOOD PRESSURE: 117 MMHG | WEIGHT: 110 LBS

## 2025-07-30 LAB
B PARAPERT DNA SPEC QL NAA+PROBE: NOT DETECTED
B PERT DNA SPEC QL NAA+PROBE: NOT DETECTED
C PNEUM DNA NPH QL NAA+NON-PROBE: NOT DETECTED
FLUAV SUBTYP SPEC NAA+PROBE: NOT DETECTED
FLUBV RNA NPH QL NAA+NON-PROBE: NOT DETECTED
HADV DNA SPEC NAA+PROBE: NOT DETECTED
HCOV 229E RNA SPEC QL NAA+PROBE: NOT DETECTED
HCOV HKU1 RNA SPEC QL NAA+PROBE: NOT DETECTED
HCOV NL63 RNA SPEC QL NAA+PROBE: NOT DETECTED
HCOV OC43 RNA SPEC QL NAA+PROBE: NOT DETECTED
HMPV RNA NPH QL NAA+NON-PROBE: NOT DETECTED
HPIV1 RNA ISLT QL NAA+PROBE: NOT DETECTED
HPIV2 RNA SPEC QL NAA+PROBE: NOT DETECTED
HPIV3 RNA NPH QL NAA+PROBE: NOT DETECTED
HPIV4 P GENE NPH QL NAA+PROBE: NOT DETECTED
M PNEUMO IGG SER IA-ACNC: NOT DETECTED
QT INTERVAL: 356 MS
QTC INTERVAL: 452 MS
RHINOVIRUS RNA SPEC NAA+PROBE: NOT DETECTED
RSV RNA NPH QL NAA+NON-PROBE: NOT DETECTED
SARS-COV-2 RNA RESP QL NAA+PROBE: NOT DETECTED

## 2025-07-30 PROCEDURE — 94640 AIRWAY INHALATION TREATMENT: CPT

## 2025-07-30 PROCEDURE — 0202U NFCT DS 22 TRGT SARS-COV-2: CPT

## 2025-07-30 PROCEDURE — 94664 DEMO&/EVAL PT USE INHALER: CPT

## 2025-07-30 RX ORDER — IPRATROPIUM BROMIDE AND ALBUTEROL SULFATE 2.5; .5 MG/3ML; MG/3ML
3 SOLUTION RESPIRATORY (INHALATION) EVERY 4 HOURS PRN
Qty: 360 ML | Refills: 3 | Status: SHIPPED | OUTPATIENT
Start: 2025-07-30

## 2025-07-30 RX ORDER — IPRATROPIUM BROMIDE AND ALBUTEROL SULFATE 2.5; .5 MG/3ML; MG/3ML
3 SOLUTION RESPIRATORY (INHALATION) ONCE
Status: COMPLETED | OUTPATIENT
Start: 2025-07-30 | End: 2025-07-30

## 2025-07-30 RX ADMIN — IPRATROPIUM BROMIDE AND ALBUTEROL SULFATE 3 ML: .5; 3 SOLUTION RESPIRATORY (INHALATION) at 02:21

## 2025-07-30 NOTE — ED PROVIDER NOTES
Subjective   History of Present Illness  69-year-old female with history of COPD and hypertension presents to the ED due to shortness of breath.  Patient endorses that she has a history of cancer.  She further endorses that she has been having some blood in her urine.  She did stop her blood thinner which stopped the blood in her urine but then it recurred.  The shortness of breath has been going on for 4 days.      Review of Systems   Constitutional: Negative.  Negative for fever.   HENT: Negative.     Respiratory:  Positive for shortness of breath.    Cardiovascular: Negative.  Negative for chest pain.   Gastrointestinal: Negative.  Negative for abdominal pain.   Endocrine: Negative.    Genitourinary:  Positive for hematuria. Negative for dysuria.   Skin: Negative.    Neurological: Negative.    Psychiatric/Behavioral: Negative.     All other systems reviewed and are negative.      Past Medical History:   Diagnosis Date    COPD (chronic obstructive pulmonary disease)     Hypertension     Osteoporosis        No Known Allergies    Past Surgical History:   Procedure Laterality Date    APPENDECTOMY      BRONCHOSCOPY       SECTION      COLONOSCOPY      SHOULDER SURGERY         Family History   Problem Relation Age of Onset    Heart failure Mother     Heart disease Father     COPD Father     COPD Brother     Skin cancer Maternal Grandmother     Multiple myeloma Maternal Aunt     Ovarian cancer Maternal Aunt     Lung cancer Maternal Uncle     Breast cancer Neg Hx        Social History     Socioeconomic History    Marital status:    Tobacco Use    Smoking status: Every Day     Current packs/day: 1.00     Average packs/day: 1 pack/day for 50.0 years (50.0 ttl pk-yrs)     Types: Cigarettes     Passive exposure: Never    Smokeless tobacco: Never   Vaping Use    Vaping status: Never Used   Substance and Sexual Activity    Alcohol use: No    Drug use: No    Sexual activity: Defer           Objective   Physical  Exam  Vitals and nursing note reviewed.   Constitutional:       General: She is not in acute distress.     Appearance: She is well-developed. She is not diaphoretic.   HENT:      Head: Normocephalic and atraumatic.      Right Ear: External ear normal.      Left Ear: External ear normal.      Nose: Nose normal.   Eyes:      Conjunctiva/sclera: Conjunctivae normal.      Pupils: Pupils are equal, round, and reactive to light.   Neck:      Vascular: No JVD.      Trachea: No tracheal deviation.   Cardiovascular:      Rate and Rhythm: Normal rate and regular rhythm.      Heart sounds: Normal heart sounds. No murmur heard.  Pulmonary:      Effort: Pulmonary effort is normal. No respiratory distress.      Breath sounds: Examination of the right-upper field reveals wheezing. Examination of the left-upper field reveals wheezing. Examination of the right-middle field reveals wheezing. Examination of the left-middle field reveals wheezing. Examination of the right-lower field reveals wheezing. Examination of the left-lower field reveals wheezing. Wheezing present.   Abdominal:      General: Bowel sounds are normal.      Palpations: Abdomen is soft.      Tenderness: There is no abdominal tenderness.   Musculoskeletal:         General: No deformity. Normal range of motion.      Cervical back: Normal range of motion and neck supple.   Skin:     General: Skin is warm and dry.      Coloration: Skin is not pale.      Findings: No erythema or rash.   Neurological:      Mental Status: She is alert and oriented to person, place, and time.      Cranial Nerves: No cranial nerve deficit.   Psychiatric:         Behavior: Behavior normal.         Thought Content: Thought content normal.         Procedures           ED Course  ED Course as of 07/30/25 0341   Tue Jul 29, 2025   9138 EKG interpretation normal sinus rhythm 97 bpm QTc is 452 no ST elevations or depressions.  Electronically signed by Puneet Morocho DO, 07/29/25, 10:09 PM  EDT.   [GF]      ED Course User Index  [GF] Puneet Morocho,                                                        Medical Decision Making  Patient's workup was negative for anything acute.  We found that there was blood in her urine.  Had a long discussion about following with urology and having a cystoscopy.  Patient is a longtime smoker.  She is high risk for a cancer.  Patient requested to be discharged.  She is feeling much better after DuoNeb's.  She no longer has any shortness of breath.  We discussed ER precautions and outpatient follow-up.    Problems Addressed:  Hematuria, unspecified type: complicated acute illness or injury  Shortness of breath: complicated acute illness or injury    Amount and/or Complexity of Data Reviewed  Labs: ordered.  Radiology: ordered.  ECG/medicine tests: ordered.    Risk  Prescription drug management.        Final diagnoses:   Shortness of breath   Hematuria, unspecified type       ED Disposition  ED Disposition       ED Disposition   Discharge    Condition   Stable    Comment   --               Bhupendra Lara, APRN  [address] [address]  [phone]    Schedule an appointment as soon as possible for a visit in 3 days      [facility] EMERGENCY DEPARTMENT  [address] [place] [id number] [phone]  Go to   If symptoms worsen         Medication List      No changes were made to your prescriptions during this visit.            Puneet Morocho DO  07/30/25 [id number]

## 2025-07-30 NOTE — ED NOTES
Pt refused a in and out cath at this time. Pt attempted to use bedside commode but was unable to pee at this time.

## 2025-08-03 LAB
BACTERIA SPEC AEROBE CULT: NORMAL
BACTERIA SPEC AEROBE CULT: NORMAL

## 2025-08-06 ENCOUNTER — HOSPITAL ENCOUNTER (OUTPATIENT)
Dept: PULMONOLOGY | Facility: HOSPITAL | Age: 70
Discharge: HOME | End: 2025-08-06
Payer: MEDICARE

## 2025-08-06 VITALS
SYSTOLIC BLOOD PRESSURE: 107 MMHG | OXYGEN SATURATION: 99 % | BODY MASS INDEX: 16.92 KG/M2 | WEIGHT: 108 LBS | DIASTOLIC BLOOD PRESSURE: 90 MMHG | HEART RATE: 73 BPM

## 2025-08-06 DIAGNOSIS — J96.11 CHRONIC RESPIRATORY FAILURE WITH HYPOXIA AND HYPERCAPNIA: ICD-10-CM

## 2025-08-06 DIAGNOSIS — F17.210 CIGARETTE NICOTINE DEPENDENCE WITHOUT COMPLICATION: ICD-10-CM

## 2025-08-06 DIAGNOSIS — J44.9 CHRONIC OBSTRUCTIVE PULMONARY DISEASE, UNSPECIFIED COPD TYPE: ICD-10-CM

## 2025-08-06 DIAGNOSIS — J44.9 STAGE 4 VERY SEVERE COPD BY GOLD CLASSIFICATION: Primary | ICD-10-CM

## 2025-08-06 DIAGNOSIS — J96.12 CHRONIC RESPIRATORY FAILURE WITH HYPOXIA AND HYPERCAPNIA: ICD-10-CM

## 2025-08-06 DIAGNOSIS — R91.8 MULTIPLE PULMONARY NODULES: ICD-10-CM

## 2025-08-06 DIAGNOSIS — B37.0 ORAL CANDIDIASIS: ICD-10-CM

## 2025-08-06 PROCEDURE — 94618 PULMONARY STRESS TESTING: CPT | Performed by: PHYSICIAN ASSISTANT

## 2025-08-06 PROCEDURE — 94664 DEMO&/EVAL PT USE INHALER: CPT

## 2025-08-06 RX ORDER — ASPIRIN 81 MG/1
81 TABLET ORAL DAILY
COMMUNITY

## 2025-08-06 RX ORDER — ROFLUMILAST 500 UG/1
500 TABLET ORAL DAILY
Qty: 90 TABLET | Refills: 3 | Status: SHIPPED | OUTPATIENT
Start: 2025-08-06

## 2025-08-06 RX ORDER — NYSTATIN 100000 [USP'U]/ML
500000 SUSPENSION ORAL 4 TIMES DAILY
Qty: 150 ML | Refills: 0 | Status: SHIPPED | OUTPATIENT
Start: 2025-08-06

## 2025-08-08 ENCOUNTER — OFFICE VISIT (OUTPATIENT)
Dept: RADIATION ONCOLOGY | Facility: HOSPITAL | Age: 70
End: 2025-08-08
Payer: MEDICARE

## 2025-08-08 VITALS
WEIGHT: 114.4 LBS | SYSTOLIC BLOOD PRESSURE: 131 MMHG | BODY MASS INDEX: 17.92 KG/M2 | OXYGEN SATURATION: 96 % | HEART RATE: 73 BPM | DIASTOLIC BLOOD PRESSURE: 74 MMHG | TEMPERATURE: 97.3 F | RESPIRATION RATE: 20 BRPM

## 2025-08-08 DIAGNOSIS — R91.8 MULTIPLE PULMONARY NODULES: Primary | ICD-10-CM

## 2025-08-08 PROCEDURE — G0463 HOSPITAL OUTPT CLINIC VISIT: HCPCS | Performed by: RADIOLOGY

## 2025-08-20 ENCOUNTER — OFFICE VISIT (OUTPATIENT)
Dept: UROLOGY | Facility: CLINIC | Age: 70
End: 2025-08-20
Payer: MEDICARE

## 2025-08-20 ENCOUNTER — PATIENT ROUNDING (BHMG ONLY) (OUTPATIENT)
Dept: UROLOGY | Facility: CLINIC | Age: 70
End: 2025-08-20

## 2025-08-20 VITALS
HEIGHT: 67 IN | DIASTOLIC BLOOD PRESSURE: 72 MMHG | SYSTOLIC BLOOD PRESSURE: 142 MMHG | WEIGHT: 107.8 LBS | HEART RATE: 82 BPM | BODY MASS INDEX: 16.92 KG/M2

## 2025-08-20 DIAGNOSIS — R35.0 FREQUENCY OF MICTURITION: Primary | ICD-10-CM

## 2025-08-20 LAB
BILIRUB BLD-MCNC: NEGATIVE MG/DL
CLARITY, POC: CLEAR
COLOR UR: YELLOW
EXPIRATION DATE: ABNORMAL
GLUCOSE UR STRIP-MCNC: NEGATIVE MG/DL
KETONES UR QL: NEGATIVE
LEUKOCYTE EST, POC: ABNORMAL
Lab: ABNORMAL
NITRITE UR-MCNC: NEGATIVE MG/ML
PH UR: 6.5 [PH] (ref 5–8)
PROT UR STRIP-MCNC: NEGATIVE MG/DL
RBC # UR STRIP: ABNORMAL /UL
SP GR UR: 1.01 (ref 1–1.03)
UROBILINOGEN UR QL: NORMAL

## 2025-08-21 LAB — REF LAB TEST METHOD: NORMAL

## 2025-08-22 ENCOUNTER — RESULTS FOLLOW-UP (OUTPATIENT)
Dept: UROLOGY | Facility: CLINIC | Age: 70
End: 2025-08-22
Payer: MEDICARE